# Patient Record
Sex: FEMALE | Race: WHITE | Employment: OTHER | URBAN - METROPOLITAN AREA
[De-identification: names, ages, dates, MRNs, and addresses within clinical notes are randomized per-mention and may not be internally consistent; named-entity substitution may affect disease eponyms.]

---

## 2017-04-03 ENCOUNTER — APPOINTMENT (OUTPATIENT)
Dept: GENERAL RADIOLOGY | Age: 82
End: 2017-04-03
Attending: EMERGENCY MEDICINE
Payer: MEDICARE

## 2017-04-03 ENCOUNTER — HOSPITAL ENCOUNTER (EMERGENCY)
Age: 82
Discharge: HOME | End: 2017-04-03
Attending: EMERGENCY MEDICINE
Payer: MEDICARE

## 2017-04-03 VITALS
BODY MASS INDEX: 23.41 KG/M2 | SYSTOLIC BLOOD PRESSURE: 126 MMHG | RESPIRATION RATE: 28 BRPM | OXYGEN SATURATION: 98 % | DIASTOLIC BLOOD PRESSURE: 48 MMHG | WEIGHT: 127.21 LBS | TEMPERATURE: 97.2 F | HEART RATE: 102 BPM | HEIGHT: 62 IN

## 2017-04-03 DIAGNOSIS — F17.200 SMOKER: ICD-10-CM

## 2017-04-03 DIAGNOSIS — J20.9 ACUTE BRONCHITIS, UNSPECIFIED ORGANISM: Primary | ICD-10-CM

## 2017-04-03 PROCEDURE — A9270 NON-COVERED ITEM OR SERVICE: HCPCS | Performed by: EMERGENCY MEDICINE

## 2017-04-03 PROCEDURE — 94640 AIRWAY INHALATION TREATMENT: CPT

## 2017-04-03 PROCEDURE — 74011000250 HC RX REV CODE- 250: Performed by: EMERGENCY MEDICINE

## 2017-04-03 PROCEDURE — 77030013140 HC MSK NEB VYRM -A

## 2017-04-03 PROCEDURE — 99284 EMERGENCY DEPT VISIT MOD MDM: CPT

## 2017-04-03 PROCEDURE — 74011636637 HC RX REV CODE- 636/637: Performed by: EMERGENCY MEDICINE

## 2017-04-03 PROCEDURE — 71020 XR CHEST PA LAT: CPT

## 2017-04-03 PROCEDURE — 93005 ELECTROCARDIOGRAM TRACING: CPT

## 2017-04-03 PROCEDURE — 74011250637 HC RX REV CODE- 250/637: Performed by: EMERGENCY MEDICINE

## 2017-04-03 RX ORDER — BENZONATATE 100 MG/1
100 CAPSULE ORAL
Status: COMPLETED | OUTPATIENT
Start: 2017-04-03 | End: 2017-04-03

## 2017-04-03 RX ORDER — ALBUTEROL SULFATE 1.25 MG/3ML
1.25 SOLUTION RESPIRATORY (INHALATION)
Qty: 25 EACH | Refills: 0 | Status: SHIPPED | OUTPATIENT
Start: 2017-04-03

## 2017-04-03 RX ORDER — PREDNISONE 20 MG/1
60 TABLET ORAL
Status: COMPLETED | OUTPATIENT
Start: 2017-04-03 | End: 2017-04-03

## 2017-04-03 RX ORDER — PREDNISONE 20 MG/1
40 TABLET ORAL DAILY
Qty: 10 TAB | Refills: 0 | Status: SHIPPED | OUTPATIENT
Start: 2017-04-03 | End: 2017-10-30

## 2017-04-03 RX ORDER — ALBUTEROL SULFATE 0.83 MG/ML
10 SOLUTION RESPIRATORY (INHALATION)
Status: COMPLETED | OUTPATIENT
Start: 2017-04-03 | End: 2017-04-03

## 2017-04-03 RX ORDER — BENZONATATE 100 MG/1
100 CAPSULE ORAL
Qty: 12 CAP | Refills: 0 | Status: SHIPPED | OUTPATIENT
Start: 2017-04-03 | End: 2017-10-30

## 2017-04-03 RX ORDER — NEBULIZER AND COMPRESSOR
1 EACH MISCELLANEOUS
Qty: 1 EACH | Refills: 0 | Status: SHIPPED | OUTPATIENT
Start: 2017-04-03

## 2017-04-03 RX ORDER — IPRATROPIUM BROMIDE AND ALBUTEROL SULFATE 2.5; .5 MG/3ML; MG/3ML
3 SOLUTION RESPIRATORY (INHALATION) ONCE
Status: COMPLETED | OUTPATIENT
Start: 2017-04-03 | End: 2017-04-03

## 2017-04-03 RX ADMIN — ALBUTEROL SULFATE 10 MG: 2.5 SOLUTION RESPIRATORY (INHALATION) at 18:21

## 2017-04-03 RX ADMIN — BENZONATATE 100 MG: 100 CAPSULE ORAL at 18:21

## 2017-04-03 RX ADMIN — IPRATROPIUM BROMIDE AND ALBUTEROL SULFATE 3 ML: .5; 3 SOLUTION RESPIRATORY (INHALATION) at 17:36

## 2017-04-03 RX ADMIN — PREDNISONE 60 MG: 20 TABLET ORAL at 17:35

## 2017-04-03 NOTE — ED NOTES
I have reviewed discharge instructions with the patient and caregiver. The patient and caregiver verbalized understanding. Pt taken to car via wheelchair.

## 2017-04-03 NOTE — DISCHARGE INSTRUCTIONS
Learning About Benefits From Quitting Smoking  How does quitting smoking make you healthier? If you're thinking about quitting smoking, you may have a few reasons to be smoke-free. Your health may be one of them. · When you quit smoking, you lower your risks for cancer, lung disease, heart attack, stroke, blood vessel disease, and blindness from macular degeneration. · When you're smoke-free, you get sick less often, and you heal faster. You are less likely to get colds, flu, bronchitis, and pneumonia. · As a nonsmoker, you may find that your mood is better and you are less stressed. When and how will you feel healthier? Quitting has real health benefits that start from day 1 of being smoke-free. And the longer you stay smoke-free, the healthier you get and the better you feel. The first hours  · After just 20 minutes, your blood pressure and heart rate go down. That means there's less stress on your heart and blood vessels. · Within 12 hours, the level of carbon monoxide in your blood drops back to normal. That makes room for more oxygen. With more oxygen in your body, you may notice that you have more energy than when you smoked. After 2 weeks  · Your lungs start to work better. · Your risk of heart attack starts to drop. After 1 month  · When your lungs are clear, you cough less and breathe deeper, so it's easier to be active. · Your sense of taste and smell return. That means you can enjoy food more than you have since you started smoking. Over the years  · After 1 year, your risk of heart disease is half what it would be if you kept smoking. · After 5 years, your risk of stroke starts to shrink. Within a few years after that, it's about the same as if you'd never smoked. · After 10 years, your risk of dying from lung cancer is cut by about half. And your risk for many other types of cancer is lower too. How would quitting help others in your life?   When you quit smoking, you improve the health of everyone who now breathes in your smoke. · Their heart, lung, and cancer risks drop, much like yours. · They are sick less. For babies and small children, living smoke-free means they're less likely to have ear infections, pneumonia, and bronchitis. · If you're a woman who is or will be pregnant someday, quitting smoking means a healthier . · Children who are close to you are less likely to become adult smokers. Where can you learn more? Go to http://vivian-millicent.info/. Enter [id number] [id number] [id number] in the search box to learn more about \"Learning About Benefits From Quitting Smoking. \"  Current as of: May 26, 2016  Content Version: 11.2  © 6082-5765 [employer]. Care instructions adapted under license by [employer] (which disclaims liability or warranty for this information). If you have questions about a medical condition or this instruction, always ask your healthcare professional. [place] [address] any warranty or liability for your use of this information. Bronchitis: Care Instructions  Your Care Instructions    Bronchitis is inflammation of the bronchial tubes, which carry air to the lungs. The tubes swell and produce mucus, or phlegm. The mucus and inflamed bronchial tubes make you cough. You may have trouble breathing. Most cases of bronchitis are caused by viruses like those that cause colds. Antibiotics usually do not help and they may be harmful. Bronchitis usually develops rapidly and lasts about 2 to 3 weeks in otherwise healthy people. Follow-up care is a key part of your treatment and safety. Be sure to make and go to all appointments, and call your doctor if you are having problems. It's also a good idea to know your test results and keep a list of the medicines you take. How can you care for yourself at home? · Take all medicines exactly as prescribed.  Call your doctor if you think you are having a problem with your medicine. · Get some extra rest.  · Take an over-the-counter pain medicine, such as acetaminophen (Tylenol), ibuprofen (Advil, Motrin), or naproxen (Aleve) to reduce fever and relieve body aches. Read and follow all instructions on the label. · Do not take two or more pain medicines at the same time unless the doctor told you to. Many pain medicines have acetaminophen, which is Tylenol. Too much acetaminophen (Tylenol) can be harmful. · Take an over-the-counter cough medicine that contains dextromethorphan to help quiet a dry, hacking cough so that you can sleep. Avoid cough medicines that have more than one active ingredient. Read and follow all instructions on the label. · Breathe moist air from a humidifier, hot shower, or sink filled with hot water. The heat and moisture will thin mucus so you can cough it out. · Do not smoke. Smoking can make bronchitis worse. If you need help quitting, talk to your doctor about stop-smoking programs and medicines. These can increase your chances of quitting for good. When should you call for help? Call 911 anytime you think you may need emergency care. For example, call if:  · You have severe trouble breathing. Call your doctor now or seek immediate medical care if:  · You have new or worse trouble breathing. · You cough up dark brown or bloody mucus (sputum). · You have a new or higher fever. · You have a new rash. Watch closely for changes in your health, and be sure to contact your doctor if:  · You cough more deeply or more often, especially if you notice more mucus or a change in the color of your mucus. · You are not getting better as expected. Where can you learn more? Go to http://vivian-millicent.info/. Enter H333 in the search box to learn more about \"Bronchitis: Care Instructions. \"  Current as of: May 23, 2016  Content Version: 11.2  © 0599-7867 [employer].  Care instructions adapted under license by [employer] (which disclaims liability or warranty for this information). If you have questions about a medical condition or this instruction, always ask your healthcare professional. [address] any warranty or liability for your use of this information. Learning About COPD and How to Prevent Lung Infections  How do lung infections affect COPD? Lung infections like pneumonia and acute bronchitis are common causes of COPD flare-ups. And people who have COPD are more likely to get these lung infections, especially if they smoke. When you have COPD, it is important to know the symptoms of pneumonia and acute bronchitis and call your doctor if you have them. Symptoms include:  · A cough that brings up more mucus than usual.  · Fever. · Shortness of breath. What can you do to prevent these infections? Stay healthy  · Get a flu shot every year. · Get a pneumococcal vaccine shot. If you have had one before, ask your doctor whether you need another dose. Two different types of pneumococcal vaccines are recommended for people ages 72 and older. · If you must be around people with colds or the flu, wash your hands often. · Do not smoke. This is the most important step you can take to prevent more damage to your lungs. If you need help quitting, talk to your doctor about stop-smoking programs and medicines. These can increase your chances of quitting for good. · Avoid secondhand smoke, air pollution, and high altitudes. Also avoid cold, dry air and hot, humid air. Stay at home with your windows closed when air pollution is bad. Exercise and eat well  · If your doctor recommends it, get more exercise. Walking is a good choice. Bit by bit, increase the amount you walk every day. Try for at least 30 minutes on most days of the week. · Eat regular, well-balanced meals. Eating right keeps your energy levels up and helps your body fight infection. · Get plenty of rest and sleep.   Follow-up care is a key part of your treatment and safety. Be sure to make and go to all appointments, and call your doctor if you are having problems. It's also a good idea to know your test results and keep a list of the medicines you take. Where can you learn more? Go to http://vivian-millicent.info/. Enter W222 in the search box to learn more about \"Learning About COPD and How to Prevent Lung Infections. \"  Current as of: May 23, 2016  Content Version: 11.2  © 5416-8262 [employer]. Care instructions adapted under license by [employer] (which disclaims liability or warranty for this information). If you have questions about a medical condition or this instruction, always ask your healthcare professional. [address] any warranty or liability for your use of this information.

## 2017-04-03 NOTE — ED PROVIDER NOTES
HPI Comments: Hillary Marcos is a 80 y.o. female with hx COPD, anemia, who presents ambulatory to the ED c/o cough, chills, congestion, rhinorrhea x 5-6 days. She sees Dr. Sandip Escalera for her hx of anemia, and was at his office today when a physician there recommended she come to the ED for concern of PNA. She reports a cough productive of clear sputum, and admits to SOB secondary to her cough. She uses a home inhaler. She specifically denies vomiting, diarrhea, fever. PCP: Glenn Saldaña,   Soc Hx: +tobacco, +EtOH    There are no other complaints, changes or physical findings at this time. The history is provided by the patient. Past Medical History:   Diagnosis Date    Breast cancer ([address] 4/4/2013    Carotid artery calcification     Constipation     COPD (chronic obstructive pulmonary disease) (HCC)     Dyslipidemia     Hypertension     Intraductal papilloma of breast 3/21/2013    Psoriasis     Thyroid adenoma     Thyroid disease        Past Surgical History:   Procedure Laterality Date    HX OOPHORECTOMY      RIGHT    HX THYROIDECTOMY      PARTIAL         Family History:   Problem Relation Age of Onset    Stroke Mother      CEREBRAL HEMMORRHAGE    Cancer Father      LUNG    Cancer Daughter      LUNG/SPINE       Social History     Social History    Marital status:      Spouse name: N/A    Number of children: N/A    Years of education: N/A     Occupational History    Not on file. Social History Main Topics    Smoking status: Current Every Day Smoker     Packs/day: 0.75     Years: 45.00    Smokeless tobacco: Never Used    Alcohol use 3.5 oz/week     7 Cans of beer per week    Drug use: Not on file    Sexual activity: Not on file     Other Topics Concern    Not on file     Social History Narrative         ALLERGIES: Review of patient's allergies indicates no known allergies. Review of Systems   Constitutional: Positive for chills.  Negative for activity change, appetite change, fever and unexpected weight change. HENT: Positive for congestion and rhinorrhea. Eyes: Negative for pain and visual disturbance. Respiratory: Positive for cough and shortness of breath. Cardiovascular: Negative for chest pain. Gastrointestinal: Negative for abdominal pain, diarrhea, nausea and vomiting. Genitourinary: Negative for dysuria. Musculoskeletal: Negative for back pain. Skin: Negative for rash. Neurological: Negative for headaches. All other systems reviewed and are negative. Vitals:    04/03/17 1531 04/03/17 1648 04/03/17 1738   BP: 146/75 133/69    Pulse: (!) 130 (!) 102    Resp: 28 28    Temp: 97.2 °F (36.2 °C)     SpO2: 96% 93% 100%   Weight: 57.7 kg (127 lb 3.3 oz)     Height: 5' 2\" (1.575 m)              Physical Exam   Constitutional: She is oriented to person, place, and time. She appears well-developed and well-nourished. She appears distressed. Elderly female in mild/moderate distress   HENT:   Head: Normocephalic and atraumatic. Mouth/Throat: Oropharynx is clear and moist.   Eyes: Conjunctivae and EOM are normal. Pupils are equal, round, and reactive to light. Right eye exhibits no discharge. Left eye exhibits no discharge. Neck: Normal range of motion. Neck supple. Cardiovascular: Normal rate and normal heart sounds. No murmur heard. HR - 96   Pulmonary/Chest: No respiratory distress. She has wheezes. She has no rales. Extensive wheezing and bronchospasms, prolonged end expirations. Abdominal: Soft. Bowel sounds are normal. She exhibits no distension. There is no tenderness. Musculoskeletal: Normal range of motion. She exhibits no edema. Neurological: She is alert and oriented to person, place, and time. No cranial nerve deficit. She exhibits normal muscle tone. Skin: Skin is warm and dry. No rash noted. She is not diaphoretic. Nursing note and vitals reviewed.        MDM  Number of Diagnoses or Management Options  Diagnosis management comments: DDx: CXR ordered from triage appears consistent with COPD exacerbation. Given lack of fever and clear sputum, will hold antibiotics and start steroids and B2 agonists. Amount and/or Complexity of Data Reviewed  Tests in the radiology section of CPT®: ordered and reviewed  Tests in the medicine section of CPT®: ordered and reviewed  Review and summarize past medical records: yes  Independent visualization of images, tracings, or specimens: yes      ED Course       Procedures    PROGRESS NOTE:  7:35 PM  Pt reevaluated. Pt feeling better, saturating at 99%, able to take deep breaths without bronchospasms. Counseled pt on smoking cessation. Written by Marcos Hair ED Scribe, as dictated by Jose De Jesus Cagle MD    LABORATORY TESTS:  Recent Results (from the past 12 hour(s))   EKG, 12 LEAD, INITIAL    Collection Time: 04/03/17  3:36 PM   Result Value Ref Range    Ventricular Rate 83 BPM    Atrial Rate 83 BPM    P-R Interval 134 ms    QRS Duration 80 ms    Q-T Interval 380 ms    QTC Calculation (Bezet) 446 ms    Calculated P Axis 82 degrees    Calculated R Axis 38 degrees    Calculated T Axis 58 degrees    Diagnosis       Sinus rhythm with premature supraventricular complexes       IMAGING RESULTS:  XR CHEST PA LAT   Final Result   CXR Results  (Last 48 hours)               04/03/17 1724  XR CHEST PA LAT Final result    Impression:  IMPRESSION: Emphysema. No acute findings. Narrative:  EXAM:  XR CHEST PA LAT       INDICATION:   cough       COMPARISON: March 22, 2013. FINDINGS: PA and lateral radiographs of the chest demonstrate moderately   hyperexpanded lungs consistent with emphysema. There is no consolidation or   pulmonary edema. No pneumothorax or pleural effusion is shown. Cardiac,   mediastinal and hilar contours appear within normal limits with mild   atherosclerotic calcification of the aortic arch again noted. The bones are   severely osteopenic.  No vertebral compression fracture is shown. MEDICATIONS GIVEN:  Medications   albuterol-ipratropium (DUO-NEB) 2.5 MG-0.5 MG/3 ML (3 mL Nebulization Given 4/3/17 1736)   predniSONE (DELTASONE) tablet 60 mg (60 mg Oral Given 4/3/17 1735)   albuterol (PROVENTIL VENTOLIN) nebulizer solution 10 mg (10 mg Nebulization Given 4/3/17 1821)   benzonatate (TESSALON) capsule 100 mg (100 mg Oral Given 4/3/17 1821)       IMPRESSION:  1. Acute bronchitis, unspecified organism    2. Smoker        PLAN:  1. Discharge Medication List as of 4/3/2017  7:38 PM      START taking these medications    Details   predniSONE (DELTASONE) 20 mg tablet Take 2 Tabs by mouth daily. , Print, Disp-10 Tab, R-0      benzonatate (TESSALON) 100 mg capsule Take 1 Cap by mouth three (3) times daily as needed for Cough. , Print, Disp-12 Cap, R-0      Nebulizer & Compressor machine 1 Each by Does Not Apply route every four (4) hours as needed. As directed, Print, Disp-1 Each, R-0      albuterol (ACCUNEB) 1.25 mg/3 mL nebu Take 3 mL by inhalation every four (4) hours as needed (wheezing). , Print, Disp-25 Each, R-0         CONTINUE these medications which have NOT CHANGED    Details   HYDROcodone-acetaminophen (VICODIN) 5-500 mg per tablet Take 1 Tab by mouth every four (4) hours as needed for Pain. Print, 1 Tab, Disp-10 Tab, R-0      PSYLLIUM SEED, WITH SUGAR, (METAMUCIL PO) Take  by mouth as needed. Historical Med      atorvastatin (LIPITOR) 20 mg tablet Take  by mouth daily. Historical Med      levothyroxine (SYNTHROID) 100 mcg tablet Take  by mouth Daily (before breakfast). Historical Med      lisinopril (PRINIVIL, ZESTRIL) 40 mg tablet Take 40 mg by mouth daily. Historical Med, 40 mg      albuterol (PROVENTIL HFA, VENTOLIN HFA) 90 mcg/actuation inhaler Take 1 Puff by inhalation. Historical Med, 1 Puff      hydrochlorothiazide (HYDRODIURIL) 25 mg tablet Take 25 mg by mouth daily.   Historical Med, 25 mg      calcium-cholecalciferol, d3, (CALCIUM 600 + D) 600-125 mg-unit Tab Take  by mouth. Historical Med      omega-3 fatty acids-vitamin e (FISH OIL) 1,000 mg Cap Take 1 Cap by mouth. Historical Med, 1 Cap           2. Follow-up Information     Follow up With Details Comments Enrique 68, DO Schedule an appointment as soon as possible for a visit in 2 days  [address] [place] [phone]      [facility] EMERGENCY DEPT  If symptoms worsen with fever, colored sputum or difficulty breathing. [address] [place] [address]  [phone]        Return to ED if worse     DISCHARGE NOTE  7:44 PM  The patient has been re-evaluated and is ready for discharge. Reviewed available results, diagnosis, and discharge instructions with patient. Pt has conveyed understanding and agreement with the diagnosis and plan. Pt agrees to F/U as recommended, or return to the ED if their sxs worsen. Written by Jer Alva, ED Scribe, as dictated by Pee Linn MD.    This note is prepared by Jer Alva acting as Scribe for Pee Linn MD.    Pee Linn MD: The scribe's documentation has been prepared under my direction and personally reviewed by me in its entirety. I confirm that the note above accurately reflects all work, treatment, procedures, and medical decision making performed by me.

## 2017-04-04 LAB
ATRIAL RATE: 83 BPM
CALCULATED P AXIS, ECG09: 82 DEGREES
CALCULATED R AXIS, ECG10: 38 DEGREES
CALCULATED T AXIS, ECG11: 58 DEGREES
DIAGNOSIS, 93000: NORMAL
P-R INTERVAL, ECG05: 134 MS
Q-T INTERVAL, ECG07: 380 MS
QRS DURATION, ECG06: 80 MS
QTC CALCULATION (BEZET), ECG08: 446 MS
VENTRICULAR RATE, ECG03: 83 BPM

## 2017-10-30 ENCOUNTER — HOSPITAL ENCOUNTER (EMERGENCY)
Age: 82
Discharge: HOME | End: 2017-10-30
Attending: FAMILY MEDICINE

## 2017-10-30 VITALS
HEIGHT: 63 IN | SYSTOLIC BLOOD PRESSURE: 193 MMHG | BODY MASS INDEX: 23.04 KG/M2 | WEIGHT: 130 LBS | RESPIRATION RATE: 18 BRPM | HEART RATE: 99 BPM | TEMPERATURE: 97.5 F | OXYGEN SATURATION: 94 % | DIASTOLIC BLOOD PRESSURE: 81 MMHG

## 2017-10-30 DIAGNOSIS — L03.116 CELLULITIS OF LEG, LEFT: Primary | ICD-10-CM

## 2017-10-30 RX ORDER — CEPHALEXIN 500 MG/1
500 CAPSULE ORAL EVERY 12 HOURS
Qty: 20 CAP | Refills: 0 | Status: SHIPPED | OUTPATIENT
Start: 2017-10-30 | End: 2017-10-30

## 2017-10-30 RX ORDER — CEPHALEXIN 500 MG/1
500 CAPSULE ORAL EVERY 12 HOURS
Qty: 20 CAP | Refills: 0 | Status: SHIPPED | OUTPATIENT
Start: 2017-10-30 | End: 2017-11-09

## 2017-10-30 NOTE — UC PROVIDER NOTE
HPI Comments: Latest Cr 0.88 two weeks ago    Patient is a 80 y.o. female presenting with leg problem. The history is provided by the patient. Leg Problem    This is a new problem. Episode onset: was cut on left lower leg by a stick 1 week ago, now reports worsening redness, swelling, pain. The problem occurs constantly. The problem has been gradually worsening. The quality of the pain is described as aching. The pain is moderate. Associated symptoms comments: Denies fever. The symptoms are aggravated by contact. She has tried nothing for the symptoms. 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 Social History Main Topics    Smoking status: Current Every Day Smoker     Packs/day: 0.75     Years: 45.00    Smokeless tobacco: Never Used    Alcohol use 3.5 oz/week     7 Cans of beer per week    Drug use: Not on file    Sexual activity: Not on file     Other Topics Concern    Not on file     Social History Narrative                ALLERGIES: Review of patient's allergies indicates no known allergies. Review of Systems   Constitutional: Negative for chills and fever. Respiratory: Negative for shortness of breath and wheezing.     Cardiovascular: Negative for chest pain and palpitations. Gastrointestinal: Negative for nausea and vomiting. Skin: Positive for color change and wound. Neurological: Negative for dizziness and headaches. Vitals:    10/30/17 1600   BP: 193/81   Pulse: 99   Resp: 18   Temp: 97.5 °F (36.4 °C)   SpO2: 94%   Weight: 59 kg (130 lb)   Height: 5' 3\" (1.6 m)       Physical Exam   Constitutional: She appears well-developed and well-nourished. No distress. Neurological: She is alert. Skin: She is not diaphoretic. Left lower leg, anterior: 1cm puncture wound with surrounding erythema, swelling, TTP extending 2 cm   Psychiatric: She has a normal mood and affect. Her behavior is normal. Judgment and thought content normal.   Nursing note and vitals reviewed. MDM     Differential Diagnosis; Clinical Impression; Plan:     CLINICAL IMPRESSION:  Cellulitis of leg, left  (primary encounter diagnosis)    Plan:  1. keflex  2. Elevate at rest  3. F/u with pcp  Risk of Significant Complications, Morbidity, and/or Mortality:   Presenting problems: Moderate  Management options:   Moderate  Progress:   Patient progress:  Stable      Procedures

## 2017-10-30 NOTE — DISCHARGE INSTRUCTIONS
Cellulitis: Care Instructions  Your Care Instructions    Cellulitis is a skin infection. It often occurs after a break in the skin from a scrape, cut, bite, or puncture, or after a rash. The doctor has checked you carefully, but problems can develop later. If you notice any problems or new symptoms, get medical treatment right away. Follow-up care is a key part of your treatment and safety. Be sure to make and go to all appointments, and call your doctor if you are having problems. It's also a good idea to know your test results and keep a list of the medicines you take. How can you care for yourself at home? · Take your antibiotics as directed. Do not stop taking them just because you feel better. You need to take the full course of antibiotics. · Prop up the infected area on pillows to reduce pain and swelling. Try to keep the area above the level of your heart as often as you can. · If your doctor told you how to care for your wound, follow your doctor's instructions. If you did not get instructions, follow this general advice:  ¨ Wash the wound with clean water 2 times a day. Don't use hydrogen peroxide or alcohol, which can slow healing. ¨ You may cover the wound with a thin layer of petroleum jelly, such as Vaseline, and a nonstick bandage. ¨ Apply more petroleum jelly and replace the bandage as needed. · Be safe with medicines. Take pain medicines exactly as directed. ¨ If the doctor gave you a prescription medicine for pain, take it as prescribed. ¨ If you are not taking a prescription pain medicine, ask your doctor if you can take an over-the-counter medicine. To prevent cellulitis in the future  · Try to prevent cuts, scrapes, or other injuries to your skin. Cellulitis most often occurs where there is a break in the skin. · If you get a scrape, cut, mild burn, or bite, wash the wound with clean water as soon as you can to help avoid infection.  Don't use hydrogen peroxide or alcohol, which can slow healing. · If you have swelling in your legs (edema), support stockings and good skin care may help prevent leg sores and cellulitis. · Take care of your feet, especially if you have diabetes or other conditions that increase the risk of infection. Wear shoes and socks. Do not go barefoot. If you have athlete's foot or other skin problems on your feet, talk to your doctor about how to treat them. When should you call for help? Call your doctor now or seek immediate medical care if:  ? · You have signs that your infection is getting worse, such as:  ¨ Increased pain, swelling, warmth, or redness. ¨ Red streaks leading from the area. ¨ Pus draining from the area. ¨ A fever. ? · You get a rash. ? Watch closely for changes in your health, and be sure to contact your doctor if:  ? · You are not getting better after 1 day (24 hours). ? · You do not get better as expected. Where can you learn more? Go to http://vivian-millicent.info/. Greg Selby in the search box to learn more about \"Cellulitis: Care Instructions. \"  Current as of: October 13, 2016  Content Version: 11.4  © 4893-7095 [employer]. Care instructions adapted under license by [employer] (which disclaims liability or warranty for this information). If you have questions about a medical condition or this instruction, always ask your healthcare professional. [place] [address] any warranty or liability for your use of this information.

## 2018-10-01 ENCOUNTER — OFFICE VISIT (OUTPATIENT)
Dept: URGENT CARE | Age: 83
End: 2018-10-01

## 2018-10-01 VITALS
TEMPERATURE: 98.1 F | HEIGHT: 63 IN | SYSTOLIC BLOOD PRESSURE: 123 MMHG | DIASTOLIC BLOOD PRESSURE: 71 MMHG | OXYGEN SATURATION: 98 % | WEIGHT: 127 LBS | BODY MASS INDEX: 22.5 KG/M2 | RESPIRATION RATE: 16 BRPM | HEART RATE: 89 BPM

## 2018-10-01 DIAGNOSIS — S51.811A SKIN TEAR OF RIGHT FOREARM WITHOUT COMPLICATION, INITIAL ENCOUNTER: Primary | ICD-10-CM

## 2018-10-01 RX ORDER — AMOXICILLIN AND CLAVULANATE POTASSIUM 500; 125 MG/1; MG/1
1 TABLET, FILM COATED ORAL 2 TIMES DAILY
Qty: 20 TAB | Refills: 0 | Status: SHIPPED | OUTPATIENT
Start: 2018-10-01 | End: 2018-10-11

## 2018-10-01 NOTE — PROGRESS NOTES
HPI Comments: Haroldo Mayfield presents with skin tear of right forearm due to daughter's dog scrapping skin off with toenail. Reports mild pain, no drainage. Has applied bandage. Has not taken anything for pain. Last tetanus unknown. The history is provided by the patient. Past Medical History:   Diagnosis Date    Breast cancer ([facility] [address] 4/4/2013    Carotid artery calcification     Constipation     COPD (chronic obstructive pulmonary disease) (HCC)     Dyslipidemia     Hypertension     Intraductal papilloma of breast 3/21/2013    Psoriasis     Thyroid adenoma     Thyroid disease         Past Surgical History:   Procedure Laterality Date    HX OOPHORECTOMY      RIGHT    HX THYROIDECTOMY      PARTIAL         Family History   Problem Relation Age of Onset    Stroke Mother      CEREBRAL HEMMORRHAGE    Cancer Father      LUNG    Cancer Daughter      LUNG/SPINE        Social History     Social History    Marital status:      Spouse name: N/A    Number of children: N/A    Years of education: N/A     Occupational History    Not on file. Social History Main Topics    Smoking status: Current Every Day Smoker     Packs/day: 0.75     Years: 45.00    Smokeless tobacco: Never Used    Alcohol use 3.5 oz/week     7 Cans of beer per week    Drug use: Not on file    Sexual activity: Not on file     Other Topics Concern    Not on file     Social History Narrative                ALLERGIES: Review of patient's allergies indicates no known allergies. Review of Systems   Constitutional: Negative for chills and fever. Respiratory: Negative for shortness of breath and wheezing. Cardiovascular: Negative for chest pain and palpitations. Musculoskeletal: Negative for myalgias. Skin: Positive for wound. Hematological: Negative for adenopathy.        Vitals:    10/01/18 1457 10/01/18 1458 10/01/18 1512   BP:  123/71    Pulse:  89    Resp:  16    Temp:  98.1 °F (36.7 °C)    SpO2:   98%   Weight:  127 lb (57.6 kg)    Height: 5' 3\" (1.6 m) 5' 3\" (1.6 m)        Physical Exam   Constitutional: She appears well-developed and well-nourished. No distress. Neurological: She is alert. Skin: She is not diaphoretic. Right forearm: 1x1cm skin tear - debrided, dressing applied   Psychiatric: She has a normal mood and affect. Her behavior is normal. Judgment and thought content normal.   Nursing note and vitals reviewed. [facility]    ICD-10-CM ICD-9-CM    1. Skin tear of right forearm without complication, initial encounter S51.811A 881.00      Medications Ordered Today   Medications    amoxicillin-clavulanate (AUGMENTIN) 500-125 mg per tablet     Sig: Take 1 Tab by mouth two (2) times a day for 10 days. Dispense:  20 Tab     Refill:  0     The patients condition was discussed with the patient and they understand. The patient is to follow up with PCP for tetanus shot, we only have Tdap available - pt declined. If signs and symptoms become worse the pt is to go to the ER. The patient is to take medications as prescribed.              Procedures

## 2018-10-01 NOTE — PATIENT INSTRUCTIONS
Skin Tears: Care Instructions  Your Care Instructions  As we get older, our skin gets drier and more fragile. Sometimes this can cause the outer layers of skin to split and tear open. Skin tears are treated in different ways. In some cases, doctors use pieces of tape called Steri-Strips to pull the skin together and help it heal. Other times, it's best to leave the tear open and cover it with a special wound-care bandage. Skin tears are usually not serious. They usually heal in a few weeks. But how long you take to heal depends on your body and the type of tear you have. Sometimes the torn piece of skin is used to protect the wound while it heals. But that piece of skin does not heal. It may fall off on its own. Or the doctor may remove it. As your tear heals, it's important to keep it clean to help prevent infection. The doctor has checked you carefully, but problems can develop later. If you notice any problems or new symptoms, get medical treatment right away. Follow-up care is a key part of your treatment and safety. Be sure to make and go to all appointments, and call your doctor if you are having problems. It's also a good idea to know your test results and keep a list of the medicines you take. How can you care for yourself at home? · If you have pain, ask your doctor if you can take an over-the-counter pain medicine, such as acetaminophen (Tylenol), ibuprofen (Advil, Motrin), or naproxen (Aleve). Be safe with medicines. Read and follow all instructions on the label. · If you have a bandage, follow your doctor's instructions for changing it. · If you have Steri-Strips, leave them on until they fall off. · Follow your doctor's instructions about bathing. · Gently wash the skin tear with plain water 2 times a day. Do not rub the area. · Let the area air dry. Or you can pat it carefully with a soft towel. When should you call for help?   Call your doctor now or seek immediate medical care if:    · You have signs of infection, such as:  ¨ Increased pain, swelling, warmth, or redness around the tear. ¨ Red streaks leading from the tear. ¨ Pus draining from the tear. ¨ A fever.     · The tear starts to bleed a lot. Small amounts of blood are normal.    Watch closely for changes in your health, and be sure to contact your doctor if:    · You do not get better as expected. Where can you learn more? Go to http://vivian-millicent.info/. Enter J603 in the search box to learn more about \"Skin Tears: Care Instructions. \"  Current as of: November 20, 2017  Content Version: 11.7  © 5673-9168 [employer]. Care instructions adapted under license by [employer] (which disclaims liability or warranty for this information). If you have questions about a medical condition or this instruction, always ask your healthcare professional. [place] [address] any warranty or liability for your use of this information.

## 2018-10-01 NOTE — MR AVS SNAPSHOT
[address] [facility] [address] 
[phone] Patient: Isabella Shahid MRN: [id number] MT Visit Information Date & Time Provider Department Dept. Phone Encounter #  
 10/1/2018  3:15 PM [address] [facility] [phone] [id number] Upcoming Health Maintenance Date Due DTaP/Tdap/Td series (1 - Tdap) 1948 Shingrix Vaccine Age 50> (1 of 2) 1977 GLAUCOMA SCREENING Q2Y 1992 Pneumococcal 65+ High/Highest Risk (1 of 2 - PCV13) 1992 MEDICARE YEARLY EXAM 3/14/2018 Influenza Age 5 to Adult 2018 Allergies as of 10/1/2018  Review Complete On: 10/1/2018 By: Kenisha Orr MD  
 No Known Allergies Current Immunizations  Never Reviewed No immunizations on file. Not reviewed this visit You Were Diagnosed With   
  
 Codes Comments Skin tear of right forearm without complication, initial encounter    -  Primary ICD-10-CM: V43.958P ICD-9-CM: 881.00 Vitals BP Pulse Temp Resp Height(growth percentile) Weight(growth percentile) 123/71 89 98.1 °F (36.7 °C) 16 5' 3\" (1.6 m) 127 lb (57.6 kg) SpO2 BMI OB Status Smoking Status 98% 22.5 kg/m2 Postmenopausal Current Every Day Smoker Vitals History BMI and BSA Data Body Mass Index Body Surface Area  
 22.5 kg/m 2 1.6 m 2 Preferred Pharmacy Pharmacy Name Phone [id number] [address] [place] [phone] Your Updated Medication List  
  
   
This list is accurate as of 10/1/18  3:26 PM.  Always use your most recent med list.  
  
  
  
  
 * albuterol 1.25 mg/3 mL Nebu Commonly known as:  Noralee Cidra Take 3 mL by inhalation every four (4) hours as needed (wheezing). * albuterol 90 mcg/actuation inhaler Commonly known as:  PROVENTIL HFA, VENTOLIN HFA, PROAIR HFA Take 1 Puff by inhalation. amoxicillin-clavulanate 500-125 mg per tablet Commonly known as:  AUGMENTIN Take 1 Tab by mouth two (2) times a day for 10 days. CALCIUM 600 + D 600-125 mg-unit Tab Generic drug:  calcium-cholecalciferol (d3) Take  by mouth. FISH OIL 1,000 mg Cap Generic drug:  omega-3 fatty acids-vitamin e Take 1 Cap by mouth. hydroCHLOROthiazide 25 mg tablet Commonly known as:  HYDRODIURIL Take 25 mg by mouth daily. LIPITOR 20 mg tablet Generic drug:  atorvastatin Take  by mouth daily. lisinopril 40 mg tablet Commonly known as:  Nancylee Foyer Take 40 mg by mouth daily. METAMUCIL PO Take  by mouth as needed. Nebulizer & Compressor machine 1 Each by Does Not Apply route every four (4) hours as needed. As directed SYNTHROID 100 mcg tablet Generic drug:  levothyroxine Take  by mouth Daily (before breakfast). * Notice: This list has 2 medication(s) that are the same as other medications prescribed for you. Read the directions carefully, and ask your doctor or other care provider to review them with you. Prescriptions Sent to Pharmacy Refills  
 amoxicillin-clavulanate (AUGMENTIN) 500-125 mg per tablet 0 Sig: Take 1 Tab by mouth two (2) times a day for 10 days. Class: Normal  
 Pharmacy: [id number] [address] #: [phone] Route: Oral  
  
Patient Instructions Skin Tears: Care Instructions Your Care Instructions As we get older, our skin gets drier and more fragile. Sometimes this can cause the outer layers of skin to split and tear open. Skin tears are treated in different ways. In some cases, doctors use pieces of tape called Steri-Strips to pull the skin together and help it heal. Other times, it's best to leave the tear open and cover it with a special wound-care bandage. Skin tears are usually not serious. They usually heal in a few weeks.  But how long you take to heal depends on your body and the type of tear you have. Sometimes the torn piece of skin is used to protect the wound while it heals. But that piece of skin does not heal. It may fall off on its own. Or the doctor may remove it. As your tear heals, it's important to keep it clean to help prevent infection. The doctor has checked you carefully, but problems can develop later. If you notice any problems or new symptoms, get medical treatment right away. Follow-up care is a key part of your treatment and safety. Be sure to make and go to all appointments, and call your doctor if you are having problems. It's also a good idea to know your test results and keep a list of the medicines you take. How can you care for yourself at home? · If you have pain, ask your doctor if you can take an over-the-counter pain medicine, such as acetaminophen (Tylenol), ibuprofen (Advil, Motrin), or naproxen (Aleve). Be safe with medicines. Read and follow all instructions on the label. · If you have a bandage, follow your doctor's instructions for changing it. · If you have Steri-Strips, leave them on until they fall off. · Follow your doctor's instructions about bathing. · Gently wash the skin tear with plain water 2 times a day. Do not rub the area. · Let the area air dry. Or you can pat it carefully with a soft towel. When should you call for help? Call your doctor now or seek immediate medical care if: 
  · You have signs of infection, such as: 
¨ Increased pain, swelling, warmth, or redness around the tear. ¨ Red streaks leading from the tear. ¨ Pus draining from the tear. ¨ A fever.  
  · The tear starts to bleed a lot. Small amounts of blood are normal.  
 Watch closely for changes in your health, and be sure to contact your doctor if: 
  · You do not get better as expected. Where can you learn more? Go to http://vivian-millicent.info/. Enter E642 in the search box to learn more about \"Skin Tears: Care Instructions. \" Current as of: November 20, 2017 Content Version: 11.7 © 7669-1185 Indicative Software, F[employer]. Care instructions adapted under license by [employer] (which disclaims liability or warranty for this information). If you have questions about a medical condition or this instruction, always ask your healthcare professional. [address] any warranty or liability for your use of this information. Introducing [facility] & HEALTH SERVICES! [facility] introduces Meridian patient portal. Now you can access parts of your medical record, email your doctor's office, and request medication refills online. 1. In your internet browser, go to [email or web address] 2. Click on the First Time User? Click Here link in the Sign In box. You will see the New Member Sign Up page. 3. Enter your Meridian Access Code exactly as it appears below. You will not need to use this code after youve completed the sign-up process. If you do not sign up before the expiration date, you must request a new code. · Meridian Access Code: [email or web address] Expires: 12/30/2018  3:26 PM 
 
4. Enter the last four digits of your Social Security Number (xxxx) and Date of Birth (mm/dd/yyyy) as indicated and click Submit. You will be taken to the next sign-up page. 5. Create a Meridian ID. This will be your Meridian login ID and cannot be changed, so think of one that is secure and easy to remember. 6. Create a Meridian password. You can change your password at any time. 7. Enter your Password Reset Question and Answer. This can be used at a later time if you forget your password. 8. Enter your e-mail address. You will receive e-mail notification when new information is available in [address] 9. Click Sign Up. You can now view and download portions of your medical record. 10. Click the Download Summary menu link to download a portable copy of your medical information. If you have questions, please visit the Frequently Asked Questions section of the [facility] website. Remember, [facility] is NOT to be used for urgent needs. For medical emergencies, dial 911. Now available from your iPhone and Android! Please provide this summary of care documentation to your next provider. Your primary care clinician is listed as [address] [place] [address]. If you have any questions after today's visit, please call [phone].

## 2019-04-11 ENCOUNTER — APPOINTMENT (OUTPATIENT)
Dept: GENERAL RADIOLOGY | Age: 84
End: 2019-04-11
Attending: EMERGENCY MEDICINE
Payer: MEDICARE

## 2019-04-11 PROCEDURE — 77030029684 HC NEB SM VOL KT MONA -A

## 2019-04-11 PROCEDURE — 99283 EMERGENCY DEPT VISIT LOW MDM: CPT

## 2019-04-12 ENCOUNTER — HOSPITAL ENCOUNTER (EMERGENCY)
Age: 84
Discharge: HOME | End: 2019-04-12
Attending: EMERGENCY MEDICINE
Payer: MEDICARE

## 2019-04-12 ENCOUNTER — APPOINTMENT (OUTPATIENT)
Dept: GENERAL RADIOLOGY | Age: 84
End: 2019-04-12
Attending: EMERGENCY MEDICINE
Payer: MEDICARE

## 2019-04-12 VITALS
HEIGHT: 60 IN | HEART RATE: 85 BPM | OXYGEN SATURATION: 95 % | TEMPERATURE: 98 F | DIASTOLIC BLOOD PRESSURE: 58 MMHG | SYSTOLIC BLOOD PRESSURE: 150 MMHG | WEIGHT: 132 LBS | BODY MASS INDEX: 25.91 KG/M2 | RESPIRATION RATE: 16 BRPM

## 2019-04-12 DIAGNOSIS — E86.0 DEHYDRATION: ICD-10-CM

## 2019-04-12 DIAGNOSIS — K59.00 CONSTIPATION, UNSPECIFIED CONSTIPATION TYPE: Primary | ICD-10-CM

## 2019-04-12 DIAGNOSIS — E87.1 HYPONATREMIA: ICD-10-CM

## 2019-04-12 LAB
ALBUMIN SERPL-MCNC: 4 G/DL (ref 3.5–5)
ALBUMIN/GLOB SERPL: 1.3 {RATIO} (ref 1.1–2.2)
ALP SERPL-CCNC: 71 U/L (ref 45–117)
ALT SERPL-CCNC: 31 U/L (ref 12–78)
ANION GAP SERPL CALC-SCNC: 8 MMOL/L (ref 5–15)
AST SERPL-CCNC: 29 U/L (ref 15–37)
BASOPHILS # BLD: 0 K/UL (ref 0–0.1)
BASOPHILS NFR BLD: 1 % (ref 0–1)
BILIRUB SERPL-MCNC: 0.7 MG/DL (ref 0.2–1)
BUN SERPL-MCNC: 18 MG/DL (ref 6–20)
BUN/CREAT SERPL: 25 (ref 12–20)
CALCIUM SERPL-MCNC: 8.7 MG/DL (ref 8.5–10.1)
CHLORIDE SERPL-SCNC: 94 MMOL/L (ref 97–108)
CO2 SERPL-SCNC: 26 MMOL/L (ref 21–32)
CREAT SERPL-MCNC: 0.72 MG/DL (ref 0.55–1.02)
DIFFERENTIAL METHOD BLD: ABNORMAL
EOSINOPHIL # BLD: 0 K/UL (ref 0–0.4)
EOSINOPHIL NFR BLD: 0 % (ref 0–7)
ERYTHROCYTE [DISTWIDTH] IN BLOOD BY AUTOMATED COUNT: 18.6 % (ref 11.5–14.5)
GLOBULIN SER CALC-MCNC: 3.2 G/DL (ref 2–4)
GLUCOSE SERPL-MCNC: 109 MG/DL (ref 65–100)
HCT VFR BLD AUTO: 36.1 % (ref 35–47)
HGB BLD-MCNC: 12.2 G/DL (ref 11.5–16)
IMM GRANULOCYTES # BLD AUTO: 0.1 K/UL (ref 0–0.04)
IMM GRANULOCYTES NFR BLD AUTO: 1 % (ref 0–0.5)
LYMPHOCYTES # BLD: 1.9 K/UL (ref 0.8–3.5)
LYMPHOCYTES NFR BLD: 28 % (ref 12–49)
MCH RBC QN AUTO: 32 PG (ref 26–34)
MCHC RBC AUTO-ENTMCNC: 33.8 G/DL (ref 30–36.5)
MCV RBC AUTO: 94.8 FL (ref 80–99)
MONOCYTES # BLD: 1 K/UL (ref 0–1)
MONOCYTES NFR BLD: 14 % (ref 5–13)
NEUTS SEG # BLD: 3.8 K/UL (ref 1.8–8)
NEUTS SEG NFR BLD: 56 % (ref 32–75)
NRBC # BLD: 0.05 K/UL (ref 0–0.01)
NRBC BLD-RTO: 0.7 PER 100 WBC
PLATELET # BLD AUTO: 268 K/UL (ref 150–400)
PMV BLD AUTO: 10.6 FL (ref 8.9–12.9)
POTASSIUM SERPL-SCNC: 4.1 MMOL/L (ref 3.5–5.1)
PROT SERPL-MCNC: 7.2 G/DL (ref 6.4–8.2)
RBC # BLD AUTO: 3.81 M/UL (ref 3.8–5.2)
SODIUM SERPL-SCNC: 128 MMOL/L (ref 136–145)
WBC # BLD AUTO: 6.8 K/UL (ref 3.6–11)

## 2019-04-12 PROCEDURE — 74011250637 HC RX REV CODE- 250/637: Performed by: EMERGENCY MEDICINE

## 2019-04-12 PROCEDURE — 36415 COLL VENOUS BLD VENIPUNCTURE: CPT

## 2019-04-12 PROCEDURE — 74011000250 HC RX REV CODE- 250: Performed by: EMERGENCY MEDICINE

## 2019-04-12 PROCEDURE — 74018 RADEX ABDOMEN 1 VIEW: CPT

## 2019-04-12 PROCEDURE — 85025 COMPLETE CBC W/AUTO DIFF WBC: CPT

## 2019-04-12 PROCEDURE — 96374 THER/PROPH/DIAG INJ IV PUSH: CPT

## 2019-04-12 PROCEDURE — 96375 TX/PRO/DX INJ NEW DRUG ADDON: CPT

## 2019-04-12 PROCEDURE — 96361 HYDRATE IV INFUSION ADD-ON: CPT

## 2019-04-12 PROCEDURE — 94640 AIRWAY INHALATION TREATMENT: CPT

## 2019-04-12 PROCEDURE — 74011250636 HC RX REV CODE- 250/636: Performed by: EMERGENCY MEDICINE

## 2019-04-12 PROCEDURE — 80053 COMPREHEN METABOLIC PANEL: CPT

## 2019-04-12 RX ORDER — TRAMADOL HYDROCHLORIDE 50 MG/1
100 TABLET ORAL
COMMUNITY
End: 2019-04-20

## 2019-04-12 RX ORDER — MORPHINE SULFATE 4 MG/ML
4 INJECTION INTRAVENOUS ONCE
Status: COMPLETED | OUTPATIENT
Start: 2019-04-12 | End: 2019-04-12

## 2019-04-12 RX ORDER — MAGNESIUM CITRATE
296 SOLUTION, ORAL ORAL
Status: COMPLETED | OUTPATIENT
Start: 2019-04-12 | End: 2019-04-12

## 2019-04-12 RX ORDER — ONDANSETRON 2 MG/ML
8 INJECTION INTRAMUSCULAR; INTRAVENOUS
Status: COMPLETED | OUTPATIENT
Start: 2019-04-12 | End: 2019-04-12

## 2019-04-12 RX ORDER — MAGNESIUM CITRATE
SOLUTION, ORAL ORAL
Qty: 2 BOTTLE | Refills: 0 | Status: SHIPPED | OUTPATIENT
Start: 2019-04-12

## 2019-04-12 RX ORDER — IPRATROPIUM BROMIDE AND ALBUTEROL SULFATE 2.5; .5 MG/3ML; MG/3ML
3 SOLUTION RESPIRATORY (INHALATION)
Status: COMPLETED | OUTPATIENT
Start: 2019-04-12 | End: 2019-04-12

## 2019-04-12 RX ADMIN — SODIUM CHLORIDE 1000 ML: 900 INJECTION, SOLUTION INTRAVENOUS at 03:59

## 2019-04-12 RX ADMIN — ONDANSETRON 8 MG: 2 INJECTION INTRAMUSCULAR; INTRAVENOUS at 04:06

## 2019-04-12 RX ADMIN — MAGNESIUM CITRATE 296 ML: 1.75 LIQUID ORAL at 05:06

## 2019-04-12 RX ADMIN — IPRATROPIUM BROMIDE AND ALBUTEROL SULFATE 3 ML: .5; 3 SOLUTION RESPIRATORY (INHALATION) at 03:43

## 2019-04-12 RX ADMIN — MORPHINE SULFATE 4 MG: 4 INJECTION INTRAVENOUS at 04:07

## 2019-04-12 NOTE — DISCHARGE INSTRUCTIONS
Patient Education        Constipation: Care Instructions  Your Care Instructions    Constipation means that you have a hard time passing stools (bowel movements). People pass stools from 3 times a day to once every 3 days. What is normal for you may be different. Constipation may occur with pain in the rectum and cramping. The pain may get worse when you try to pass stools. Sometimes there are small amounts of bright red blood on toilet paper or the surface of stools. This is because of enlarged veins near the rectum (hemorrhoids). A few changes in your diet and lifestyle may help you avoid ongoing constipation. Your doctor may also prescribe medicine to help loosen your stool. Some medicines can cause constipation. These include pain medicines and antidepressants. Tell your doctor about all the medicines you take. Your doctor may want to make a medicine change to ease your symptoms. Follow-up care is a key part of your treatment and safety. Be sure to make and go to all appointments, and call your doctor if you are having problems. It's also a good idea to know your test results and keep a list of the medicines you take. How can you care for yourself at home? · Drink plenty of fluids, enough so that your urine is light yellow or clear like water. If you have kidney, heart, or liver disease and have to limit fluids, talk with your doctor before you increase the amount of fluids you drink. · Include high-fiber foods in your diet each day. These include fruits, vegetables, beans, and whole grains. · Get at least 30 minutes of exercise on most days of the week. Walking is a good choice. You also may want to do other activities, such as running, swimming, cycling, or playing tennis or team sports. · Take a fiber supplement, such as Citrucel or Metamucil, every day. Read and follow all instructions on the label. · Schedule time each day for a bowel movement. A daily routine may help.  Take your time having your bowel movement. · Support your feet with a small step stool when you sit on the toilet. This helps flex your hips and places your pelvis in a squatting position. · Your doctor may recommend an over-the-counter laxative to relieve your constipation. Examples are Milk of Magnesia and MiraLax. Read and follow all instructions on the label. Do not use laxatives on a long-term basis. When should you call for help? Call your doctor now or seek immediate medical care if:    · You have new or worse belly pain.     · You have new or worse nausea or vomiting.     · You have blood in your stools.    Watch closely for changes in your health, and be sure to contact your doctor if:    · Your constipation is getting worse.     · You do not get better as expected. Where can you learn more? Go to http://vivian-millicent.info/. Enter 21 [id number] in the search box to learn more about \"Constipation: Care Instructions. \"  Current as of: September 23, 2018  Content Version: 11.9  © 7851-6057 [employer]. Care instructions adapted under license by [employer]at (which disclaims liability or warranty for this information). If you have questions about a medical condition or this instruction, always ask your healthcare professional. [place] [address] any warranty or liability for your use of this information. Patient Education        Hyponatremia: Care Instructions  Your Care Instructions  Hyponatremia (say \"cc-jl-cik-TREE-hernan-uh\") means that you don't have enough sodium in your blood. It can cause nausea, vomiting, and headaches. Or you may not feel hungry. In serious cases, it can cause seizures, a coma, or even death. Hyponatremia is not a disease. It is a problem caused by something else, such as medicines or exercising for a long time in hot weather.   You can get hyponatremia if you lose a lot of fluids and then you drink a lot of water or other liquids that don't have much sodium. You can also get it if you have kidney, liver, heart, or other health problems. Treatment is focused on getting your sodium levels back to normal.  Follow-up care is a key part of your treatment and safety. Be sure to make and go to all appointments, and call your doctor if you are having problems. It's also a good idea to know your test results and keep a list of the medicines you take. How can you care for yourself at home? · If your doctor recommends it, drink fluids that have sodium. Sports drinks are a good choice. Or you can eat salty foods. · If your doctor recommends it, limit the amount of water you drink. And limit fluids that are mostly water. These include tea, coffee, and juice. · Take your medicines exactly as prescribed. Call your doctor if you have any problems with your medicine. · Get your sodium levels tested when your doctor tells you to. When should you call for help? Call 911 anytime you think you may need emergency care. For example, call if:    · You have a seizure.     · You passed out (lost consciousness).    Call your doctor now or seek immediate medical care if:    · You are confused or it is hard to focus.     · You have little or no appetite.     · You feel sick to your stomach or you vomit.     · You have a headache.     · You have mood changes.     · You feel more tired than usual.    Watch closely for changes in your health, and be sure to contact your doctor if:    · You do not get better as expected. Where can you learn more? Go to http://vivian-millicent.info/. Enter M632 in the search box to learn more about \"Hyponatremia: Care Instructions. \"  Current as of: June 25, 2018  Content Version: 11.9  © 7228-8418 [employer]. Care instructions adapted under license by [employer] (which disclaims liability or warranty for this information).  If you have questions about a medical condition or this instruction, always ask your healthcare professional. [address] any warranty or liability for your use of this information.

## 2019-04-12 NOTE — ED NOTES
Patient presents after not being able to have a BM for the past week. Patient states she is on Tramadol. Per patient she has taken suppositories, miralax and no relief.

## 2019-04-12 NOTE — ED NOTES
Pt discharged by Dr. Michael Sun. Pt provided with discharge instructions Rx and instructions on follow up care. Pt out of ED in stable condition accompanied by family.

## 2019-04-12 NOTE — ED PROVIDER NOTES
EMERGENCY DEPARTMENT HISTORY AND PHYSICAL EXAM 
     
 
Date: 4/12/2019 Patient Name: Anson Newberry History of Presenting Illness Chief Complaint Patient presents with  Constipation  
  last BM a week ago; pt taking tramadol for back fx; pt has tried laxatives and suppositories History Provided By: Patient and Patient's Daughter HPI: Anson Newberry is a 80 y.o. female, pmhx COPD, hypertension, arthritis, who presents dilatory to the ED c/o abdominal pain and constipation with>7 days no output. On tramadol for arthiritis, increased today for continued pain. Hx COPD also notes mild SOB. She notes her appetite has been normal she is been eating and drinking well although when she mentions her diet seems to be a bunch of sandwiches without any true sustenance. Patient specifically denies any recent fevers, chills, nausea, vomiting, diarrhea, abd pain, CP, SOB, urinary sxs, changes in BM, or headache. PCP: Corie Frost DO Allergies: None known Social Hx: Daily tobacco, occasional EtOH, none illicit Drugs There are no other complaints, changes, or physical findings at this time. Current Outpatient Medications Medication Sig Dispense Refill  traMADol (ULTRAM) 50 mg tablet Take 100 mg by mouth every six (6) hours as needed for Pain.  magnesium citrate solution Drink one bottle. If you have not had large stool output, drink the second bottle. 2 Bottle 0  
 Nebulizer & Compressor machine 1 Each by Does Not Apply route every four (4) hours as needed. As directed 1 Each 0  
 albuterol (ACCUNEB) 1.25 mg/3 mL nebu Take 3 mL by inhalation every four (4) hours as needed (wheezing). 25 Each 0  
 PSYLLIUM SEED, WITH SUGAR, (METAMUCIL PO) Take  by mouth as needed.  atorvastatin (LIPITOR) 20 mg tablet Take  by mouth daily.  levothyroxine (SYNTHROID) 100 mcg tablet Take  by mouth Daily (before breakfast).  lisinopril (PRINIVIL, ZESTRIL) 40 mg tablet Take 40 mg by mouth daily.  albuterol (PROVENTIL HFA, VENTOLIN HFA) 90 mcg/actuation inhaler Take 1 Puff by inhalation.  hydrochlorothiazide (HYDRODIURIL) 25 mg tablet Take 25 mg by mouth daily.  calcium-cholecalciferol, d3, (CALCIUM 600 + D) 600-125 mg-unit Tab Take  by mouth.  omega-3 fatty acids-vitamin e (FISH OIL) 1,000 mg Cap Take 1 Cap by mouth. Past History Past Medical History: 
Past Medical History:  
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 
Past Surgical History:  
Procedure Laterality Date  HX OOPHORECTOMY    
 RIGHT  HX THYROIDECTOMY PARTIAL Family History: 
Family History Problem Relation Age of Onset  Stroke Mother CEREBRAL HEMMORRHAGE  Cancer Father LUNG  
 Cancer Daughter LUNG/SPINE Social History: 
Social History Tobacco Use  Smoking status: Current Every Day Smoker Packs/day: 0.75 Years: 45.00 Pack years: 33.75  Smokeless tobacco: Never Used Substance Use Topics  Alcohol use: Yes Alcohol/week: 3.5 oz Types: 7 Cans of beer per week  Drug use: Never Allergies: 
No Known Allergies Review of Systems Review of Systems Constitutional: Negative for activity change, appetite change, chills, fever and unexpected weight change. HENT: Negative for congestion. Eyes: Negative for pain and visual disturbance. Respiratory: Negative for cough and shortness of breath. Cardiovascular: Negative for chest pain. Gastrointestinal: Negative for abdominal pain, diarrhea, nausea and vomiting. Genitourinary: Negative for dysuria. Musculoskeletal: Negative for back pain. Skin: Negative for rash. Neurological: Negative for headaches. Physical Exam  
Physical Exam  
Constitutional: She is oriented to person, place, and time. She appears well-developed and well-nourished. Thin elderly kyphotic female in moderate distress HENT:  
Head: Normocephalic and atraumatic. Mouth/Throat: Oropharynx is clear and moist.  
Eyes: Pupils are equal, round, and reactive to light. Conjunctivae and EOM are normal. Right eye exhibits no discharge. Left eye exhibits no discharge. Neck: Normal range of motion. Neck supple. Cardiovascular: Normal rate, regular rhythm and normal heart sounds. No murmur heard. Pulmonary/Chest: Effort normal and breath sounds normal. No respiratory distress. She has no wheezes. She has no rales. Abdominal: Soft. Bowel sounds are normal. She exhibits distension (Mild diffuse distension). She exhibits no mass. There is no tenderness. There is no rebound and no guarding. Musculoskeletal: Normal range of motion. She exhibits no edema. Neurological: She is alert and oriented to person, place, and time. No cranial nerve deficit. She exhibits normal muscle tone. Skin: Skin is warm and dry. No rash noted. She is not diaphoretic. Nursing note and vitals reviewed. Diagnostic Study Results Labs - No results found for this or any previous visit (from the past 12 hour(s)). Radiologic Studies -  
XR ABD (KUB) Final Result IMPRESSION: Rectal constipation versus obstipation. CT Results  (Last 48 hours) None CXR Results  (Last 48 hours) None Medical Decision Making I am the first provider for this patient. I reviewed the vital signs, available nursing notes, past medical history, past surgical history, family history and social history. Vital Signs-Reviewed the patient's vital signs. No data found.  
 
Pulse Oximetry Analysis - 94% on RA 
 
 Cardiac Monitor:  
Rate: 85bpm 
Rhythm: Normal Sinus Rhythm Records Reviewed: Nursing Notes and Old Medical Records Provider Notes (Medical Decision Making): MDM: Elderly female presenting with constipation and no stool output for a week. Patient states she is not even passing gas. She has been eating and drinking normally and has had normal appetite without any reports of fevers, chills, nausea or vomiting; thus low suspicion for obstruction, obstipation given benign exam.  Lab analysis sent from triage notable for hyponatremia 128. Reviewing her history appears patient is chronically low likely secondary to poor intake. Discussed with patient and her daughter increased salt intake. ED Course:  
Initial assessment performed. The patients presenting problems have been discussed, and they are in agreement with the care plan formulated and outlined with them. I have encouraged them to ask questions as they arise throughout their visit. 3:20 AM  Dietary counseling. I have discussed with the patient appropriate water intake and dietary (macronutrient) intake as recommended by the AMA. We also discussed the risk of increased sugar intake, fat intake and inappropriate water intake as well as the risks associated with prolonged hyperglycemia and high cholesterol levels. PROGRESS NOTE: 
5 AM 
Pt with minimal output of brown water output after enema. Upon attempt of disimpaction patient's rectal vault appears empty except for 2 very small hard round pieces of stool. After disimpaction however there patient has had multiple episodes of flatulence with increasing water output. Discussed with her oral laxatives while she is here in the emergency room to which she agrees. Discharge note: 
[phone] Pt re-evaluated and noted to be feeling better, ready for discharge. Updated pt and family on all final lab and radiology findings.   Will follow up as instructed with her primary care physician for repeat lab check this week. All questions have been answered, pt voiced understanding and agreement with plan. Specific return precautions provided as well as instructions to return to the ED should sx worsen at any time. Vital signs stable for discharge. Critical Care Time:  
0 Diagnosis Clinical Impression: 1. Constipation, unspecified constipation type 2. Dehydration 3. Hyponatremia PLAN: 
1. Discharge Medication List as of 4/12/2019  6:26 AM  
  
START taking these medications Details  
magnesium citrate solution Drink one bottle. If you have not had large stool output, drink the second bottle., Normal, Disp-2 Bottle, R-0  
  
  
CONTINUE these medications which have NOT CHANGED Details  
traMADol (ULTRAM) 50 mg tablet Take 100 mg by mouth every six (6) hours as needed for Pain., Historical Med Nebulizer & Compressor machine 1 Each by Does Not Apply route every four (4) hours as needed. As directed, Print, Disp-1 Each, R-0  
  
albuterol (ACCUNEB) 1.25 mg/3 mL nebu Take 3 mL by inhalation every four (4) hours as needed (wheezing). , Print, Disp-25 Each, R-0  
  
PSYLLIUM SEED, WITH SUGAR, (METAMUCIL PO) Take  by mouth as needed. Historical Med  
  
atorvastatin (LIPITOR) 20 mg tablet Take  by mouth daily. Historical Med  
  
levothyroxine (SYNTHROID) 100 mcg tablet Take  by mouth Daily (before breakfast). Historical Med  
  
lisinopril (PRINIVIL, ZESTRIL) 40 mg tablet Take 40 mg by mouth daily. Historical Med, 40 mg  
  
albuterol (PROVENTIL HFA, VENTOLIN HFA) 90 mcg/actuation inhaler Take 1 Puff by inhalation. Historical Med, 1 Puff  
  
hydrochlorothiazide (HYDRODIURIL) 25 mg tablet Take 25 mg by mouth daily. Historical Med, 25 mg  
  
calcium-cholecalciferol, d3, (CALCIUM 600 + D) 600-125 mg-unit Tab Take  by mouth.   Historical Med  
  
 omega-3 fatty acids-vitamin e (FISH OIL) 1,000 mg Cap Take 1 Cap by mouth. Historical Med, 1 Cap 2. Follow-up Information Follow up With Specialties Details Why Contact Info Radha Gregg DO Family Practice Schedule an appointment as soon as possible for a visit For rehceck of symptoms and electrolytes Monday [address] [facility] [address] [phone] [facility] EMERGENCY DEPT Emergency Medicine  If symptoms worsen [address] [facility] 
[phone] Return to ED if worse Disposition: 
Home

## 2019-04-18 ENCOUNTER — HOSPITAL ENCOUNTER (INPATIENT)
Age: 84
LOS: 2 days | Discharge: HOME HEALTH | DRG: 543 | End: 2019-04-20
Attending: EMERGENCY MEDICINE | Admitting: INTERNAL MEDICINE
Payer: MEDICARE

## 2019-04-18 ENCOUNTER — APPOINTMENT (OUTPATIENT)
Dept: GENERAL RADIOLOGY | Age: 84
DRG: 543 | End: 2019-04-18
Attending: NURSE PRACTITIONER
Payer: MEDICARE

## 2019-04-18 ENCOUNTER — APPOINTMENT (OUTPATIENT)
Dept: CT IMAGING | Age: 84
DRG: 543 | End: 2019-04-18
Attending: NURSE PRACTITIONER
Payer: MEDICARE

## 2019-04-18 DIAGNOSIS — M54.9 INTRACTABLE BACK PAIN: ICD-10-CM

## 2019-04-18 DIAGNOSIS — M48.50XA COMPRESSION FRACTURE OF VERTEBRA (HCC): Primary | ICD-10-CM

## 2019-04-18 LAB
ALBUMIN SERPL-MCNC: 3.8 G/DL (ref 3.5–5)
ALBUMIN/GLOB SERPL: 1.4 {RATIO} (ref 1.1–2.2)
ALP SERPL-CCNC: 97 U/L (ref 45–117)
ALT SERPL-CCNC: 31 U/L (ref 12–78)
ANION GAP SERPL CALC-SCNC: 8 MMOL/L (ref 5–15)
APPEARANCE UR: CLEAR
AST SERPL-CCNC: 24 U/L (ref 15–37)
BACTERIA URNS QL MICRO: NEGATIVE /HPF
BASOPHILS # BLD: 0 K/UL (ref 0–0.1)
BASOPHILS NFR BLD: 0 % (ref 0–1)
BILIRUB SERPL-MCNC: 0.8 MG/DL (ref 0.2–1)
BILIRUB UR QL: NEGATIVE
BUN SERPL-MCNC: 13 MG/DL (ref 6–20)
BUN/CREAT SERPL: 17 (ref 12–20)
CALCIUM SERPL-MCNC: 8.6 MG/DL (ref 8.5–10.1)
CHLORIDE SERPL-SCNC: 94 MMOL/L (ref 97–108)
CO2 SERPL-SCNC: 26 MMOL/L (ref 21–32)
COLOR UR: ABNORMAL
CREAT SERPL-MCNC: 0.78 MG/DL (ref 0.55–1.02)
DIFFERENTIAL METHOD BLD: ABNORMAL
EOSINOPHIL # BLD: 0 K/UL (ref 0–0.4)
EOSINOPHIL NFR BLD: 0 % (ref 0–7)
EPITH CASTS URNS QL MICRO: ABNORMAL /LPF
ERYTHROCYTE [DISTWIDTH] IN BLOOD BY AUTOMATED COUNT: 19.5 % (ref 11.5–14.5)
GLOBULIN SER CALC-MCNC: 2.8 G/DL (ref 2–4)
GLUCOSE SERPL-MCNC: 96 MG/DL (ref 65–100)
GLUCOSE UR STRIP.AUTO-MCNC: NEGATIVE MG/DL
HCT VFR BLD AUTO: 33.5 % (ref 35–47)
HGB BLD-MCNC: 11.3 G/DL (ref 11.5–16)
HGB UR QL STRIP: NEGATIVE
HYALINE CASTS URNS QL MICRO: ABNORMAL /LPF (ref 0–5)
IMM GRANULOCYTES # BLD AUTO: 0.1 K/UL (ref 0–0.04)
IMM GRANULOCYTES NFR BLD AUTO: 1 % (ref 0–0.5)
KETONES UR QL STRIP.AUTO: NEGATIVE MG/DL
LEUKOCYTE ESTERASE UR QL STRIP.AUTO: ABNORMAL
LIPASE SERPL-CCNC: 70 U/L (ref 73–393)
LYMPHOCYTES # BLD: 2.2 K/UL (ref 0.8–3.5)
LYMPHOCYTES NFR BLD: 25 % (ref 12–49)
MCH RBC QN AUTO: 31.5 PG (ref 26–34)
MCHC RBC AUTO-ENTMCNC: 33.7 G/DL (ref 30–36.5)
MCV RBC AUTO: 93.3 FL (ref 80–99)
MONOCYTES # BLD: 1.3 K/UL (ref 0–1)
MONOCYTES NFR BLD: 15 % (ref 5–13)
NEUTS SEG # BLD: 5.4 K/UL (ref 1.8–8)
NEUTS SEG NFR BLD: 59 % (ref 32–75)
NITRITE UR QL STRIP.AUTO: NEGATIVE
NRBC # BLD: 0.03 K/UL (ref 0–0.01)
NRBC BLD-RTO: 0.3 PER 100 WBC
PH UR STRIP: 6 [PH] (ref 5–8)
PLATELET # BLD AUTO: 245 K/UL (ref 150–400)
PMV BLD AUTO: 9.9 FL (ref 8.9–12.9)
POTASSIUM SERPL-SCNC: 3.8 MMOL/L (ref 3.5–5.1)
PROT SERPL-MCNC: 6.6 G/DL (ref 6.4–8.2)
PROT UR STRIP-MCNC: NEGATIVE MG/DL
RBC # BLD AUTO: 3.59 M/UL (ref 3.8–5.2)
RBC #/AREA URNS HPF: ABNORMAL /HPF (ref 0–5)
SODIUM SERPL-SCNC: 128 MMOL/L (ref 136–145)
SP GR UR REFRACTOMETRY: 1.01 (ref 1–1.03)
UROBILINOGEN UR QL STRIP.AUTO: 1 EU/DL (ref 0.2–1)
WBC # BLD AUTO: 9 K/UL (ref 3.6–11)
WBC URNS QL MICRO: ABNORMAL /HPF (ref 0–4)

## 2019-04-18 PROCEDURE — 74011250637 HC RX REV CODE- 250/637: Performed by: INTERNAL MEDICINE

## 2019-04-18 PROCEDURE — 99285 EMERGENCY DEPT VISIT HI MDM: CPT

## 2019-04-18 PROCEDURE — 74011000250 HC RX REV CODE- 250: Performed by: INTERNAL MEDICINE

## 2019-04-18 PROCEDURE — 74011250637 HC RX REV CODE- 250/637: Performed by: NURSE PRACTITIONER

## 2019-04-18 PROCEDURE — 81001 URINALYSIS AUTO W/SCOPE: CPT

## 2019-04-18 PROCEDURE — 36415 COLL VENOUS BLD VENIPUNCTURE: CPT

## 2019-04-18 PROCEDURE — 72128 CT CHEST SPINE W/O DYE: CPT

## 2019-04-18 PROCEDURE — 71046 X-RAY EXAM CHEST 2 VIEWS: CPT

## 2019-04-18 PROCEDURE — 74011636320 HC RX REV CODE- 636/320: Performed by: EMERGENCY MEDICINE

## 2019-04-18 PROCEDURE — 80053 COMPREHEN METABOLIC PANEL: CPT

## 2019-04-18 PROCEDURE — 74177 CT ABD & PELVIS W/CONTRAST: CPT

## 2019-04-18 PROCEDURE — 77030038269 HC DRN EXT URIN PURWCK BARD -A

## 2019-04-18 PROCEDURE — 65270000029 HC RM PRIVATE

## 2019-04-18 PROCEDURE — 83690 ASSAY OF LIPASE: CPT

## 2019-04-18 PROCEDURE — 85025 COMPLETE CBC W/AUTO DIFF WBC: CPT

## 2019-04-18 RX ORDER — GLUCOSAM/CHONDRO/HERB 149/HYAL 750-100 MG
1 TABLET ORAL DAILY
Status: DISCONTINUED | OUTPATIENT
Start: 2019-04-19 | End: 2019-04-20 | Stop reason: HOSPADM

## 2019-04-18 RX ORDER — ALBUTEROL SULFATE 0.83 MG/ML
2.5 SOLUTION RESPIRATORY (INHALATION)
Status: DISCONTINUED | OUTPATIENT
Start: 2019-04-18 | End: 2019-04-20 | Stop reason: HOSPADM

## 2019-04-18 RX ORDER — SORBITOL SOLUTION 70 %
30 SOLUTION, ORAL MISCELLANEOUS
Status: DISPENSED | OUTPATIENT
Start: 2019-04-18 | End: 2019-04-19

## 2019-04-18 RX ORDER — SODIUM CHLORIDE 0.9 % (FLUSH) 0.9 %
5-40 SYRINGE (ML) INJECTION AS NEEDED
Status: DISCONTINUED | OUTPATIENT
Start: 2019-04-18 | End: 2019-04-20 | Stop reason: HOSPADM

## 2019-04-18 RX ORDER — FERROUS SULFATE, DRIED 160(50) MG
1 TABLET, EXTENDED RELEASE ORAL
Status: DISCONTINUED | OUTPATIENT
Start: 2019-04-19 | End: 2019-04-20 | Stop reason: HOSPADM

## 2019-04-18 RX ORDER — SODIUM CHLORIDE 0.9 % (FLUSH) 0.9 %
10 SYRINGE (ML) INJECTION
Status: COMPLETED | OUTPATIENT
Start: 2019-04-18 | End: 2019-04-18

## 2019-04-18 RX ORDER — OXYCODONE AND ACETAMINOPHEN 5; 325 MG/1; MG/1
1 TABLET ORAL
Status: COMPLETED | OUTPATIENT
Start: 2019-04-18 | End: 2019-04-18

## 2019-04-18 RX ORDER — ATORVASTATIN CALCIUM 20 MG/1
20 TABLET, FILM COATED ORAL DAILY
Status: DISCONTINUED | OUTPATIENT
Start: 2019-04-19 | End: 2019-04-20 | Stop reason: HOSPADM

## 2019-04-18 RX ORDER — MORPHINE SULFATE 2 MG/ML
2 INJECTION, SOLUTION INTRAMUSCULAR; INTRAVENOUS
Status: DISCONTINUED | OUTPATIENT
Start: 2019-04-18 | End: 2019-04-18

## 2019-04-18 RX ORDER — LISINOPRIL 20 MG/1
40 TABLET ORAL DAILY
Status: DISCONTINUED | OUTPATIENT
Start: 2019-04-19 | End: 2019-04-20 | Stop reason: HOSPADM

## 2019-04-18 RX ORDER — DOCUSATE SODIUM 100 MG/1
100 CAPSULE, LIQUID FILLED ORAL 2 TIMES DAILY
Status: DISCONTINUED | OUTPATIENT
Start: 2019-04-19 | End: 2019-04-20 | Stop reason: HOSPADM

## 2019-04-18 RX ORDER — ONDANSETRON 2 MG/ML
4 INJECTION INTRAMUSCULAR; INTRAVENOUS
Status: DISCONTINUED | OUTPATIENT
Start: 2019-04-18 | End: 2019-04-20 | Stop reason: HOSPADM

## 2019-04-18 RX ORDER — ACETAMINOPHEN 325 MG/1
650 TABLET ORAL 4 TIMES DAILY
Status: DISCONTINUED | OUTPATIENT
Start: 2019-04-18 | End: 2019-04-20 | Stop reason: HOSPADM

## 2019-04-18 RX ORDER — POLYETHYLENE GLYCOL 3350 17 G/17G
17 POWDER, FOR SOLUTION ORAL DAILY
Status: DISCONTINUED | OUTPATIENT
Start: 2019-04-19 | End: 2019-04-20 | Stop reason: HOSPADM

## 2019-04-18 RX ORDER — OXYCODONE AND ACETAMINOPHEN 5; 325 MG/1; MG/1
1 TABLET ORAL
Status: DISCONTINUED | OUTPATIENT
Start: 2019-04-18 | End: 2019-04-20 | Stop reason: HOSPADM

## 2019-04-18 RX ORDER — SODIUM CHLORIDE 0.9 % (FLUSH) 0.9 %
5-40 SYRINGE (ML) INJECTION EVERY 8 HOURS
Status: DISCONTINUED | OUTPATIENT
Start: 2019-04-18 | End: 2019-04-20 | Stop reason: HOSPADM

## 2019-04-18 RX ORDER — HEPARIN SODIUM 5000 [USP'U]/ML
5000 INJECTION, SOLUTION INTRAVENOUS; SUBCUTANEOUS EVERY 12 HOURS
Status: DISCONTINUED | OUTPATIENT
Start: 2019-04-19 | End: 2019-04-20 | Stop reason: HOSPADM

## 2019-04-18 RX ORDER — LEVOTHYROXINE SODIUM 100 UG/1
100 TABLET ORAL
Status: DISCONTINUED | OUTPATIENT
Start: 2019-04-19 | End: 2019-04-20 | Stop reason: HOSPADM

## 2019-04-18 RX ORDER — HYDROCHLOROTHIAZIDE 25 MG/1
25 TABLET ORAL DAILY
Status: DISCONTINUED | OUTPATIENT
Start: 2019-04-19 | End: 2019-04-19

## 2019-04-18 RX ORDER — IPRATROPIUM BROMIDE AND ALBUTEROL SULFATE 2.5; .5 MG/3ML; MG/3ML
3 SOLUTION RESPIRATORY (INHALATION) EVERY 12 HOURS
Status: DISCONTINUED | OUTPATIENT
Start: 2019-04-18 | End: 2019-04-20 | Stop reason: HOSPADM

## 2019-04-18 RX ORDER — MORPHINE SULFATE 2 MG/ML
1 INJECTION, SOLUTION INTRAMUSCULAR; INTRAVENOUS
Status: DISCONTINUED | OUTPATIENT
Start: 2019-04-18 | End: 2019-04-20 | Stop reason: HOSPADM

## 2019-04-18 RX ADMIN — IOPAMIDOL 100 ML: 755 INJECTION, SOLUTION INTRAVENOUS at 17:48

## 2019-04-18 RX ADMIN — Medication 10 ML: at 17:48

## 2019-04-18 RX ADMIN — OXYCODONE AND ACETAMINOPHEN 1 TABLET: 5; 325 TABLET ORAL at 19:04

## 2019-04-18 RX ADMIN — ACETAMINOPHEN 650 MG: 325 TABLET ORAL at 22:19

## 2019-04-18 RX ADMIN — IPRATROPIUM BROMIDE AND ALBUTEROL SULFATE 3 ML: .5; 3 SOLUTION RESPIRATORY (INHALATION) at 22:19

## 2019-04-18 NOTE — CONSULTS
ORTHOPAEDIC CONSULT NOTE    Subjective:     Date of Consultation:  April 18, 0096      Lacey Flynn is a 80 y.o. female who is being seen for irretractable back pain. Pt reports the pain started in her mid back 3 weeks ago after she twisted while removing her curtains to wash. Pt was seen at [facility] with diagnosis of T spine compression fracture and started on tramadol and calcitonin with min relief that has limited her mobility that caused her daughter to bring her to ED, she has outpt Ct and bone scan ordered for 4/19. Pt denies extremity numbness/tingling, bowel/ bladder incontinence. No other injuries or falls. Patient Active Problem List    Diagnosis Date Noted    Breast cancer ([facility] 04/04/2013     Priority: 1 - One     Class: Stage 1    Intraductal papilloma of breast 03/21/2013    HTN (hypertension) 07/11/2012    Dyslipidemia 07/11/2012    Chronic airway obstruction, not elsewhere classified 07/11/2012    Carotid artery calcification 07/11/2012    Tobacco use disorder 07/11/2012     Family History   Problem Relation Age of Onset    Stroke Mother         CEREBRAL HEMMORRHAGE    Cancer Father         LUNG    Cancer Daughter         LUNG/SPINE      Social History     Tobacco Use    Smoking status: Current Every Day Smoker     Packs/day: 0.75     Years: 45.00     Pack years: 33.75    Smokeless tobacco: Never Used   Substance Use Topics    Alcohol use:  Yes     Alcohol/week: 3.5 oz     Types: 7 Cans of beer per week     Past Medical History:   Diagnosis Date    Breast cancer ([facility] 4/4/2013    Carotid artery calcification     Constipation     COPD (chronic obstructive pulmonary disease) (HCC)     Dyslipidemia     Hypertension     Intraductal papilloma of breast 3/21/2013    Psoriasis     Thyroid adenoma     Thyroid disease       Past Surgical History:   Procedure Laterality Date    HX OOPHORECTOMY      RIGHT    HX THYROIDECTOMY      PARTIAL      Prior to Admission medications Medication Sig Start Date End Date Taking? Authorizing Provider   traMADol (ULTRAM) 50 mg tablet Take 100 mg by mouth every six (6) hours as needed for Pain. Other, MD Artis   magnesium citrate solution Drink one bottle. If you have not had large stool output, drink the second bottle. 4/12/19   TermeerAngel MD   Nebulizer & Compressor machine 1 Each by Does Not Apply route every four (4) hours as needed. As directed 4/3/17   TermeerAngel MD   albuterol (ACCUNEB) 1.25 mg/3 mL nebu Take 3 mL by inhalation every four (4) hours as needed (wheezing). 4/3/17   Termeer, Angel Jeffers MD   PSYLLIUM SEED, WITH SUGAR, (METAMUCIL PO) Take  by mouth as needed. Provider, Historical   atorvastatin (LIPITOR) 20 mg tablet Take  by mouth daily. Provider, Historical   levothyroxine (SYNTHROID) 100 mcg tablet Take  by mouth Daily (before breakfast). Provider, Historical   lisinopril (PRINIVIL, ZESTRIL) 40 mg tablet Take 40 mg by mouth daily. Provider, Historical   albuterol (PROVENTIL HFA, VENTOLIN HFA) 90 mcg/actuation inhaler Take 1 Puff by inhalation. Provider, Historical   hydrochlorothiazide (HYDRODIURIL) 25 mg tablet Take 25 mg by mouth daily. Provider, Historical   calcium-cholecalciferol, d3, (CALCIUM 600 + D) 600-125 mg-unit Tab Take  by mouth. Provider, Historical   omega-3 fatty acids-vitamin e (FISH OIL) 1,000 mg Cap Take 1 Cap by mouth. Provider, Historical     Current Facility-Administered Medications   Medication Dose Route Frequency    sodium chloride (NS) flush 10 mL  10 mL IntraVENous RAD ONCE    iopamidol (ISOVUE-370) 76 % injection 100 mL  100 mL IntraVENous RAD ONCE     Current Outpatient Medications   Medication Sig    traMADol (ULTRAM) 50 mg tablet Take 100 mg by mouth every six (6) hours as needed for Pain.  magnesium citrate solution Drink one bottle. If you have not had large stool output, drink the second bottle.     Nebulizer & Compressor machine 1 Each by Does Not Apply route every four (4) hours as needed. As directed    albuterol (ACCUNEB) 1.25 mg/3 mL nebu Take 3 mL by inhalation every four (4) hours as needed (wheezing).  PSYLLIUM SEED, WITH SUGAR, (METAMUCIL PO) Take  by mouth as needed.  atorvastatin (LIPITOR) 20 mg tablet Take  by mouth daily.  levothyroxine (SYNTHROID) 100 mcg tablet Take  by mouth Daily (before breakfast).  lisinopril (PRINIVIL, ZESTRIL) 40 mg tablet Take 40 mg by mouth daily.  albuterol (PROVENTIL HFA, VENTOLIN HFA) 90 mcg/actuation inhaler Take 1 Puff by inhalation.  hydrochlorothiazide (HYDRODIURIL) 25 mg tablet Take 25 mg by mouth daily.  calcium-cholecalciferol, d3, (CALCIUM 600 + D) 600-125 mg-unit Tab Take  by mouth.  omega-3 fatty acids-vitamin e (FISH OIL) 1,000 mg Cap Take 1 Cap by mouth. No Known Allergies     Review of Systems:  A comprehensive review of systems was negative except for that written in the HPI. Mental Status: no dementia    Objective:     Patient Vitals for the past 8 hrs:   BP Temp Pulse Resp SpO2 Height Weight   19 1541      5' (1.524 m) 59.9 kg (132 lb)   19 1533 123/74 98 °F (36.7 °C) 83 18 94 %       Temp (24hrs), Av °F (36.7 °C), Min:98 °F (36.7 °C), Max:98 °F (36.7 °C)      Gen: Well-developed,  in no acute distress    Musc: FROM of UE/LE, + TTP of lower T spine, NVI    Skin: No skin breakdown noted. Skin warm, pink, dry  Neuro: Cranial nerves are grossly intact, no focal motor weakness, follows commands appropriately   Psych: Good insight, oriented to person, place and time, alert    Imaging Review: pending     Labs: No results found for this or any previous visit (from the past 24 hour(s)).       Impression:     Patient Active Problem List    Diagnosis Date Noted    Breast cancer ([facility] [address] 2013     Priority: 1 - One     Class: Stage 1    Intraductal papilloma of breast 2013    HTN (hypertension) 2012  Dyslipidemia 07/11/2012    Chronic airway obstruction, not elsewhere classified 07/11/2012    Carotid artery calcification 07/11/2012    Tobacco use disorder 07/11/2012     Active Problems:    * No active hospital problems. *      Plan:   -  Pt is stable orthopaedically, Non-Operative management at this time  -  Admit to medicine if needed for pain management, up as tolerated   -  CT and bone scan as planned, will follow up for results and plan for kyphoplasty  -  Will inform Dr. Mariano Calderon of admission     Dr. Laws Limb aware and agrees with plan as above.         Bertrand Foss, NP  Orthopedic Nurse Practitioner   [place]

## 2019-04-18 NOTE — ED PROVIDER NOTES
EMERGENCY DEPARTMENT HISTORY AND PHYSICAL EXAM 
 
 
Date: 4/18/2019 Patient Name: Melissa Garcia History of Presenting Illness Chief Complaint Patient presents with  Back Pain  
  pt presents to ED with complaint of back pain. pt has lower back fx and was scheduled to have surgery tomorrow but was unable to stand the pain any longer History Provided By: Patient HPI: Melissa Garcia, 80 y.o. female with PMHx significant for hypertension, COPD, dyslipidemia, breast cancer, shortness of breath due to ongoing tobacco use disorder, presents via EMS to the ED with cc of intractable back pain that has been progressive for the last 3 weeks. 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 She was able to catch herself but did twist in an abnormal position. She was seen by the orthopedic specialist 3 weeks ago and I T9 compression fracture. She has been taking tramadol for pain without relief. There has been no further fall injury or obvious trauma. She was supposed to have a CT of the thoracic spine as well as a bone scan tomorrow to determine if she is eligible for a kyphoplasty. She is no longer to able to ambulate up at home and her family is not able to care for her. Shortness of breath is asked is currently at baseline. She does endorse generalized abdominal pain. Pt denies fevers, chills, night sweats, chest pain, pressure, n/v/d, melena, hematuria, dysuria, constipation, HA, dizziness, and syncope There are no other complaints, changes, or physical findings at this time. PCP: Shawnee Oglesby, DO No current facility-administered medications on file prior to encounter. Current Outpatient Medications on File Prior to Encounter Medication Sig Dispense Refill  traMADol (ULTRAM) 50 mg tablet Take 100 mg by mouth every six (6) hours as needed for Pain.  magnesium citrate solution Drink one bottle. If you have not had large stool output, drink the second bottle. 2 Bottle 0  
 Nebulizer & Compressor machine 1 Each by Does Not Apply route every four (4) hours as needed. As directed 1 Each 0  
 albuterol (ACCUNEB) 1.25 mg/3 mL nebu Take 3 mL by inhalation every four (4) hours as needed (wheezing). 25 Each 0  
 PSYLLIUM SEED, WITH SUGAR, (METAMUCIL PO) Take  by mouth as needed.  atorvastatin (LIPITOR) 20 mg tablet Take  by mouth daily.  levothyroxine (SYNTHROID) 100 mcg tablet Take  by mouth Daily (before breakfast).  lisinopril (PRINIVIL, ZESTRIL) 40 mg tablet Take 40 mg by mouth daily.  albuterol (PROVENTIL HFA, VENTOLIN HFA) 90 mcg/actuation inhaler Take 1 Puff by inhalation.  hydrochlorothiazide (HYDRODIURIL) 25 mg tablet Take 25 mg by mouth daily.  calcium-cholecalciferol, d3, (CALCIUM 600 + D) 600-125 mg-unit Tab Take  by mouth.  omega-3 fatty acids-vitamin e (FISH OIL) 1,000 mg Cap Take 1 Cap by mouth. Past History Past Medical History: 
Past Medical History:  
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 
Past Surgical History:  
Procedure Laterality Date  HX OOPHORECTOMY    
 RIGHT  HX THYROIDECTOMY PARTIAL Family History: 
Family History Problem Relation Age of Onset  Stroke Mother CEREBRAL HEMMORRHAGE  Cancer Father LUNG  
 Cancer Daughter LUNG/SPINE Social History: 
Social History Tobacco Use  Smoking status: Current Every Day Smoker Packs/day: 0.75 Years: 45.00 Pack years: 33.75  Smokeless tobacco: Never Used Substance Use Topics  Alcohol use: Yes Alcohol/week: 3.5 oz Types: 7 Cans of beer per week  Drug use: Never Allergies: 
No Known Allergies Review of Systems Review of Systems Constitutional: Negative for activity change, appetite change, chills, diaphoresis, fatigue, fever and unexpected weight change. HENT: Negative for congestion, ear pain, rhinorrhea, sinus pressure, sore throat and tinnitus. Eyes: Negative for photophobia, pain, discharge and visual disturbance. Respiratory: Positive for shortness of breath. Negative for apnea, cough, choking, chest tightness, wheezing and stridor. Cardiovascular: Negative for chest pain, palpitations and leg swelling. Gastrointestinal: Negative for abdominal pain, constipation, diarrhea, nausea and vomiting. Endocrine: Negative for polydipsia, polyphagia and polyuria. Genitourinary: Negative for decreased urine volume, dyspareunia, dysuria, enuresis, flank pain, frequency, hematuria and urgency. Musculoskeletal: Positive for back pain. Negative for arthralgias, gait problem, myalgias and neck pain. Skin: Negative for color change, pallor, rash and wound. Allergic/Immunologic: Negative for immunocompromised state. Neurological: Negative for dizziness, seizures, syncope, weakness, light-headedness and headaches. Hematological: Does not bruise/bleed easily. Psychiatric/Behavioral: Negative for agitation and confusion. The patient is not nervous/anxious. Physical Exam  
Physical Exam  
Constitutional: She is oriented to person, place, and time. She appears well-developed and well-nourished. No distress. HENT:  
Head: Normocephalic. Right Ear: External ear normal.  
Left Ear: External ear normal.  
Mouth/Throat: Oropharynx is clear and moist. No oropharyngeal exudate. Eyes: Pupils are equal, round, and reactive to light. Conjunctivae and EOM are normal. Right eye exhibits no discharge.  Left eye exhibits no discharge. No scleral icterus. Neck: Normal range of motion. Neck supple. No JVD present. No tracheal deviation present. No thyromegaly present. Cardiovascular: Normal rate, regular rhythm, normal heart sounds and intact distal pulses. Exam reveals no gallop and no friction rub. No murmur heard. Pulmonary/Chest: Effort normal and breath sounds normal. No stridor. No respiratory distress. She has no wheezes. She has no rales. She exhibits no tenderness. Abdominal: Soft. Bowel sounds are normal. She exhibits no distension and no mass. There is no tenderness. There is no rebound and no guarding. Musculoskeletal: Normal range of motion. She exhibits no edema or tenderness. Lymphadenopathy:  
  She has no cervical adenopathy. Neurological: She is alert and oriented to person, place, and time. She displays normal reflexes. No cranial nerve deficit. Coordination normal.  
Skin: Skin is warm and dry. No rash noted. She is not diaphoretic. No erythema. No pallor. Psychiatric: She has a normal mood and affect. Her behavior is normal. Judgment and thought content normal.  
Nursing note and vitals reviewed. Diagnostic Study Results Labs - Recent Results (from the past 12 hour(s)) METABOLIC PANEL, COMPREHENSIVE Collection Time: 04/18/19  4:58 PM  
Result Value Ref Range Sodium 128 (L) 136 - 145 mmol/L Potassium 3.8 3.5 - 5.1 mmol/L Chloride 94 (L) 97 - 108 mmol/L  
 CO2 26 21 - 32 mmol/L Anion gap 8 5 - 15 mmol/L Glucose 96 65 - 100 mg/dL BUN 13 6 - 20 MG/DL Creatinine 0.78 0.55 - 1.02 MG/DL  
 BUN/Creatinine ratio 17 12 - 20 GFR est AA >60 >60 ml/min/1.73m2 GFR est non-AA >60 >60 ml/min/1.73m2 Calcium 8.6 8.5 - 10.1 MG/DL Bilirubin, total 0.8 0.2 - 1.0 MG/DL  
 ALT (SGPT) 31 12 - 78 U/L  
 AST (SGOT) 24 15 - 37 U/L Alk. phosphatase 97 45 - 117 U/L Protein, total 6.6 6.4 - 8.2 g/dL Albumin 3.8 3.5 - 5.0 g/dL Globulin 2.8 2.0 - 4.0 g/dL A-G Ratio 1.4 1.1 - 2.2    
CBC WITH AUTOMATED DIFF Collection Time: 04/18/19  4:58 PM  
Result Value Ref Range WBC 9.0 3.6 - 11.0 K/uL  
 RBC 3.59 (L) 3.80 - 5.20 M/uL  
 HGB 11.3 (L) 11.5 - 16.0 g/dL HCT 33.5 (L) 35.0 - 47.0 % MCV 93.3 80.0 - 99.0 FL  
 MCH 31.5 26.0 - 34.0 PG  
 MCHC 33.7 30.0 - 36.5 g/dL  
 RDW 19.5 (H) 11.5 - 14.5 % PLATELET 001 967 - 228 K/uL MPV 9.9 8.9 - 12.9 FL  
 NRBC 0.3 (H) 0  WBC ABSOLUTE NRBC 0.03 (H) 0.00 - 0.01 K/uL NEUTROPHILS 59 32 - 75 % LYMPHOCYTES 25 12 - 49 % MONOCYTES 15 (H) 5 - 13 % EOSINOPHILS 0 0 - 7 % BASOPHILS 0 0 - 1 % IMMATURE GRANULOCYTES 1 (H) 0.0 - 0.5 % ABS. NEUTROPHILS 5.4 1.8 - 8.0 K/UL  
 ABS. LYMPHOCYTES 2.2 0.8 - 3.5 K/UL  
 ABS. MONOCYTES 1.3 (H) 0.0 - 1.0 K/UL  
 ABS. EOSINOPHILS 0.0 0.0 - 0.4 K/UL  
 ABS. BASOPHILS 0.0 0.0 - 0.1 K/UL  
 ABS. IMM. GRANS. 0.1 (H) 0.00 - 0.04 K/UL  
 DF AUTOMATED URINALYSIS W/MICROSCOPIC Collection Time: 04/18/19  4:58 PM  
Result Value Ref Range Color YELLOW/STRAW Appearance CLEAR CLEAR Specific gravity 1.013 1.003 - 1.030    
 pH (UA) 6.0 5.0 - 8.0 Protein NEGATIVE  NEG mg/dL Glucose NEGATIVE  NEG mg/dL Ketone NEGATIVE  NEG mg/dL Bilirubin NEGATIVE  NEG Blood NEGATIVE  NEG Urobilinogen 1.0 0.2 - 1.0 EU/dL Nitrites NEGATIVE  NEG Leukocyte Esterase SMALL (A) NEG    
 WBC 10-20 0 - 4 /hpf  
 RBC 0-5 0 - 5 /hpf Epithelial cells FEW FEW /lpf Bacteria NEGATIVE  NEG /hpf Hyaline cast 2-5 0 - 5 /lpf  
LIPASE Collection Time: 04/18/19  4:58 PM  
Result Value Ref Range Lipase 70 (L) 73 - 393 U/L Radiologic Studies -  
CT SPINE [facility] WO CONT Final Result IMPRESSION:   
1. Collapse of T9 with slight retropulsion. While new in the interval and  
remains age indeterminate. Gas within the superior aspect of the endplate favors  
against an acute injury.  If clinically indicated MR imaging would be required to  
 further evaluate CT ABD PELV W CONT Final Result IMPRESSION:  
No acute abnormality XR CHEST PA LAT Final Result Impression: 1. Hyperinflated lungs, no acute cardiopulmonary disease 2. Osteopenia. Interval collapse of approximately T9. Correlate with exam for  
pain CT Results  (Last 48 hours) 04/18/19 1748  [facility] CONT Final result Impression:  IMPRESSION:   
1. Collapse of T9 with slight retropulsion. While new in the interval and  
remains age indeterminate. Gas within the superior aspect of the endplate favors  
against an acute injury. If clinically indicated MR imaging would be required to  
further evaluate Narrative:  EXAM:  CT SPINE THORAC WO CONT INDICATION: Compression fracture. COMPARISON: 4/3/2017. TECHNIQUE:   
Multislice helical CT of the thoracic spine was performed. Sagittal and coronal  
reformations were generated. CT dose reduction was achieved through use of a  
standardized protocol tailored for this examination and automatic exposure  
control for dose modulation. FINDINGS:  
The alignment of the thoracic spine is normal.   
There is collapse of T9. There is gas within the disc space above and below this  
level with vacuum phenomenon within the disc. There is slight retropulsion. While new in the interval this is age indeterminate. There is a bony cleft  
within the vertebral body laterally on the left There is no spinal canal stenosis. 04/18/19 1748  CT ABD PELV W CONT Final result Impression:  IMPRESSION:  
No acute abnormality Narrative:  EXAM: CT ABD PELV W CONT INDICATION: abd pain COMPARISON: 4/26/2014 CONTRAST: 100 mL of Isovue-370. TECHNIQUE:   
Following the uneventful intravenous administration of contrast, thin axial  
images were obtained through the abdomen and pelvis.  Coronal and sagittal  
 reconstructions were generated. Oral contrast was not administered. CT dose  
reduction was achieved through use of a standardized protocol tailored for this  
examination and automatic exposure control for dose modulation. FINDINGS:   
LUNG BASES: Clear. INCIDENTALLY IMAGED HEART AND MEDIASTINUM: Unremarkable. LIVER: Multiple hypodensities within the liver. Largest is in the right hepatic  
lobe measures approximately 10 mm unchanged. GALLBLADDER: Small stones within a noninflamed gallbladder SPLEEN: No mass. PANCREAS: No mass or ductal dilatation. ADRENALS: Unremarkable. KIDNEYS: No mass, calculus, or hydronephrosis. Probable cyst right kidney STOMACH: Unremarkable. SMALL BOWEL: No dilatation or wall thickening. COLON: No dilatation or wall thickening. APPENDIX: Surgically absent PERITONEUM: No ascites or pneumoperitoneum. RETROPERITONEUM: No lymphadenopathy or aortic aneurysm. Extensive vascular  
calcifications REPRODUCTIVE ORGANS: Surgically absent URINARY BLADDER: No mass or calculus. BONES: No destructive bone lesion. Bones are osteopenic. Multilevel degenerative  
change. An acute fracture is not identified ADDITIONAL COMMENTS: N/A  
   
  
  
 
CXR Results  (Last 48 hours) 04/18/19 1729  XR CHEST PA LAT Final result Impression:  Impression: 1. Hyperinflated lungs, no acute cardiopulmonary disease 2. Osteopenia. Interval collapse of approximately T9. Correlate with exam for  
pain Narrative:  INDICATION:  sob Exam: Chest 2 views. Comparison: 4/3/2017. FINDINGS: Lung volumes remain hyperinflated. . Cardiomediastinal silhouette is  
normal. Pulmonary vasculature is not engorged. No focal parenchymal opacities,  
effusions, or pneumothorax. Bones are osteopenic with interval collapse of  
approximately T9. Medical Decision Making I am the first provider for this patient. I reviewed the vital signs, available nursing notes, past medical history, past surgical history, family history and social history. Vital Signs-Reviewed the patient's vital signs. Patient Vitals for the past 12 hrs: 
 Temp Pulse Resp BP SpO2  
04/18/19 1533 98 °F (36.7 °C) 83 18 123/74 94 % Pulse Oximetry Analysis - 94% on RA Cardiac Monitor:  
Rate: 83 bpm 
Rhythm: Normal Sinus Rhythm Records Reviewed: Nursing Notes, Old Medical Records, Previous electrocardiograms, Previous Radiology Studies and Previous Laboratory Studies Provider Notes (Medical Decision Making): Intractable back pain 
 
Routine laboratory data, UA, CT thoracic, abdomen and pelvis consult to orthopedics CONSULT NOTE:  
[address], NP spoke with Maryjo Wagner NP, Specialty: Marshall Maher Discussed pt's hx, disposition, and available diagnostic and imaging results. Reviewed care plans. Consultant agrees with plans as outlined. NP to bedside for evaluation. Josie Horner NP 
 
CONSULT NOTE:  
7:44 PM 
Josie Horner NP spoke with Dr. Cain Velazquez MD, Specialty: Hospitalist 
Discussed pt's hx, disposition, and available diagnostic and imaging results. Reviewed care plans. Consultant agrees with plans as outlined. MD to evaluate for admission. Josie Horner NP 
 
 
ED Course:  
Initial assessment performed. The patients presenting problems have been discussed, and they are in agreement with the care plan formulated and outlined with them. I have encouraged them to ask questions as they arise throughout their visit. Critical Care Time:  
0 Disposition: 
Admit to inpatient PLAN: 
7:44 PM 
Patient is being admitted to the hospital.  The results of their tests and reasons for their admission have been discussed with them and/or available family. They convey agreement and understanding for the need to be admitted and for their admission diagnosis.   Consultation has been made with the inpatient physician specialist for hospitalization. LABORATORY TESTS: 
Recent Results (from the past 12 hour(s)) METABOLIC PANEL, COMPREHENSIVE Collection Time: 04/18/19  4:58 PM  
Result Value Ref Range Sodium 128 (L) 136 - 145 mmol/L Potassium 3.8 3.5 - 5.1 mmol/L Chloride 94 (L) 97 - 108 mmol/L  
 CO2 26 21 - 32 mmol/L Anion gap 8 5 - 15 mmol/L Glucose 96 65 - 100 mg/dL BUN 13 6 - 20 MG/DL Creatinine 0.78 0.55 - 1.02 MG/DL  
 BUN/Creatinine ratio 17 12 - 20 GFR est AA >60 >60 ml/min/1.73m2 GFR est non-AA >60 >60 ml/min/1.73m2 Calcium 8.6 8.5 - 10.1 MG/DL Bilirubin, total 0.8 0.2 - 1.0 MG/DL  
 ALT (SGPT) 31 12 - 78 U/L  
 AST (SGOT) 24 15 - 37 U/L Alk. phosphatase 97 45 - 117 U/L Protein, total 6.6 6.4 - 8.2 g/dL Albumin 3.8 3.5 - 5.0 g/dL Globulin 2.8 2.0 - 4.0 g/dL A-G Ratio 1.4 1.1 - 2.2    
CBC WITH AUTOMATED DIFF Collection Time: 04/18/19  4:58 PM  
Result Value Ref Range WBC 9.0 3.6 - 11.0 K/uL  
 RBC 3.59 (L) 3.80 - 5.20 M/uL  
 HGB 11.3 (L) 11.5 - 16.0 g/dL HCT 33.5 (L) 35.0 - 47.0 % MCV 93.3 80.0 - 99.0 FL  
 MCH 31.5 26.0 - 34.0 PG  
 MCHC 33.7 30.0 - 36.5 g/dL  
 RDW 19.5 (H) 11.5 - 14.5 % PLATELET 667 880 - 657 K/uL MPV 9.9 8.9 - 12.9 FL  
 NRBC 0.3 (H) 0  WBC ABSOLUTE NRBC 0.03 (H) 0.00 - 0.01 K/uL NEUTROPHILS 59 32 - 75 % LYMPHOCYTES 25 12 - 49 % MONOCYTES 15 (H) 5 - 13 % EOSINOPHILS 0 0 - 7 % BASOPHILS 0 0 - 1 % IMMATURE GRANULOCYTES 1 (H) 0.0 - 0.5 % ABS. NEUTROPHILS 5.4 1.8 - 8.0 K/UL  
 ABS. LYMPHOCYTES 2.2 0.8 - 3.5 K/UL  
 ABS. MONOCYTES 1.3 (H) 0.0 - 1.0 K/UL  
 ABS. EOSINOPHILS 0.0 0.0 - 0.4 K/UL  
 ABS. BASOPHILS 0.0 0.0 - 0.1 K/UL  
 ABS. IMM. GRANS. 0.1 (H) 0.00 - 0.04 K/UL  
 DF AUTOMATED URINALYSIS W/MICROSCOPIC Collection Time: 04/18/19  4:58 PM  
Result Value Ref Range Color YELLOW/STRAW Appearance CLEAR CLEAR  Specific gravity 1.013 1.003 - 1.030    
 pH (UA) 6.0 5.0 - 8.0 Protein NEGATIVE  NEG mg/dL Glucose NEGATIVE  NEG mg/dL Ketone NEGATIVE  NEG mg/dL Bilirubin NEGATIVE  NEG Blood NEGATIVE  NEG Urobilinogen 1.0 0.2 - 1.0 EU/dL Nitrites NEGATIVE  NEG Leukocyte Esterase SMALL (A) NEG    
 WBC 10-20 0 - 4 /hpf  
 RBC 0-5 0 - 5 /hpf Epithelial cells FEW FEW /lpf Bacteria NEGATIVE  NEG /hpf Hyaline cast 2-5 0 - 5 /lpf  
LIPASE Collection Time: 04/18/19  4:58 PM  
Result Value Ref Range Lipase 70 (L) 73 - 393 U/L IMAGING RESULTS: 
CT SPINE THORAC WO CONT Final Result IMPRESSION:   
1. Collapse of T9 with slight retropulsion. While new in the interval and  
remains age indeterminate. Gas within the superior aspect of the endplate favors  
against an acute injury. If clinically indicated MR imaging would be required to  
further evaluate CT ABD PELV W CONT Final Result IMPRESSION:  
No acute abnormality XR CHEST PA LAT Final Result Impression: 1. Hyperinflated lungs, no acute cardiopulmonary disease 2. Osteopenia. Interval collapse of approximately T9. Correlate with exam for  
pain Xr Chest Pa Lat Result Date: 4/18/2019 INDICATION:  sob Exam: Chest 2 views. Comparison: 4/3/2017. FINDINGS: Lung volumes remain hyperinflated. . Cardiomediastinal silhouette is normal. Pulmonary vasculature is not engorged. No focal parenchymal opacities, effusions, or pneumothorax. Bones are osteopenic with interval collapse of approximately T9. Impression: 1. Hyperinflated lungs, no acute cardiopulmonary disease 2. Osteopenia. Interval collapse of approximately T9. Correlate with exam for pain Ct Spine Thorac Wo Cont Result Date: 4/18/2019 EXAM:  CT SPINE THORAC WO CONT INDICATION: Compression fracture. COMPARISON: 4/3/2017. TECHNIQUE: Multislice helical CT of the thoracic spine was performed. Sagittal and coronal reformations were generated.  CT dose reduction was achieved through use of a standardized protocol tailored for this examination and automatic exposure control for dose modulation. FINDINGS: The alignment of the thoracic spine is normal. There is collapse of T9. There is gas within the disc space above and below this level with vacuum phenomenon within the disc. There is slight retropulsion. While new in the interval this is age indeterminate. There is a bony cleft within the vertebral body laterally on the left There is no spinal canal stenosis. IMPRESSION: 1. Collapse of T9 with slight retropulsion. While new in the interval and remains age indeterminate. Gas within the superior aspect of the endplate favors against an acute injury. If clinically indicated MR imaging would be required to further evaluate Ct Abd Pelv W Cont Result Date: 4/18/2019 EXAM: CT ABD PELV W CONT INDICATION: abd pain COMPARISON: 4/26/2014 CONTRAST: 100 mL of Isovue-370. TECHNIQUE: Following the uneventful intravenous administration of contrast, thin axial images were obtained through the abdomen and pelvis. Coronal and sagittal reconstructions were generated. Oral contrast was not administered. CT dose reduction was achieved through use of a standardized protocol tailored for this examination and automatic exposure control for dose modulation. FINDINGS: LUNG BASES: Clear. INCIDENTALLY IMAGED HEART AND MEDIASTINUM: Unremarkable. LIVER: Multiple hypodensities within the liver. Largest is in the right hepatic lobe measures approximately 10 mm unchanged. GALLBLADDER: Small stones within a noninflamed gallbladder SPLEEN: No mass. PANCREAS: No mass or ductal dilatation. ADRENALS: Unremarkable. KIDNEYS: No mass, calculus, or hydronephrosis. Probable cyst right kidney STOMACH: Unremarkable. SMALL BOWEL: No dilatation or wall thickening. COLON: No dilatation or wall thickening. APPENDIX: Surgically absent PERITONEUM: No ascites or pneumoperitoneum.  RETROPERITONEUM: No lymphadenopathy or aortic aneurysm. Extensive vascular calcifications REPRODUCTIVE ORGANS: Surgically absent URINARY BLADDER: No mass or calculus. BONES: No destructive bone lesion. Bones are osteopenic. Multilevel degenerative change. An acute fracture is not identified ADDITIONAL COMMENTS: N/A IMPRESSION: No acute abnormality MEDICATIONS GIVEN: 
Medications  
cefTRIAXone (ROCEPHIN) 1 g in 0.9% sodium chloride (MBP/ADV) 50 mL (has no administration in time range)  
sodium chloride (NS) flush 10 mL (10 mL IntraVENous Given 4/18/19 1748) iopamidol (ISOVUE-370) 76 % injection 100 mL (100 mL IntraVENous Given 4/18/19 1748) oxyCODONE-acetaminophen (PERCOCET) 5-325 mg per tablet 1 Tab (1 Tab Oral Given 4/18/19 1904) IMPRESSION: 
1. Intractable back pain PLAN: 
1. Admit to hospitalist 
 
Diagnosis Clinical Impression:  
1. Intractable back pain Attestations: 
 
Morgan Rutherford NP 
7:44 PM

## 2019-04-19 ENCOUNTER — APPOINTMENT (OUTPATIENT)
Dept: NUCLEAR MEDICINE | Age: 84
DRG: 543 | End: 2019-04-19
Attending: NURSE PRACTITIONER
Payer: MEDICARE

## 2019-04-19 ENCOUNTER — APPOINTMENT (OUTPATIENT)
Dept: MRI IMAGING | Age: 84
DRG: 543 | End: 2019-04-19
Attending: INTERNAL MEDICINE
Payer: MEDICARE

## 2019-04-19 ENCOUNTER — HOSPITAL ENCOUNTER (OUTPATIENT)
Dept: NUCLEAR MEDICINE | Age: 84
Discharge: HOME | End: 2019-04-19
Attending: PHYSICAL MEDICINE & REHABILITATION

## 2019-04-19 LAB
ANION GAP SERPL CALC-SCNC: 7 MMOL/L (ref 5–15)
BUN SERPL-MCNC: 12 MG/DL (ref 6–20)
BUN/CREAT SERPL: 19 (ref 12–20)
CALCIUM SERPL-MCNC: 8.5 MG/DL (ref 8.5–10.1)
CHLORIDE SERPL-SCNC: 98 MMOL/L (ref 97–108)
CO2 SERPL-SCNC: 25 MMOL/L (ref 21–32)
CREAT SERPL-MCNC: 0.62 MG/DL (ref 0.55–1.02)
ERYTHROCYTE [DISTWIDTH] IN BLOOD BY AUTOMATED COUNT: 19.6 % (ref 11.5–14.5)
GLUCOSE SERPL-MCNC: 93 MG/DL (ref 65–100)
HCT VFR BLD AUTO: 35.8 % (ref 35–47)
HGB BLD-MCNC: 11.9 G/DL (ref 11.5–16)
MCH RBC QN AUTO: 31.5 PG (ref 26–34)
MCHC RBC AUTO-ENTMCNC: 33.2 G/DL (ref 30–36.5)
MCV RBC AUTO: 94.7 FL (ref 80–99)
NRBC # BLD: 0.02 K/UL (ref 0–0.01)
NRBC BLD-RTO: 0.3 PER 100 WBC
PLATELET # BLD AUTO: 262 K/UL (ref 150–400)
PMV BLD AUTO: 10.3 FL (ref 8.9–12.9)
POTASSIUM SERPL-SCNC: 3.9 MMOL/L (ref 3.5–5.1)
RBC # BLD AUTO: 3.78 M/UL (ref 3.8–5.2)
SODIUM SERPL-SCNC: 130 MMOL/L (ref 136–145)
WBC # BLD AUTO: 6.4 K/UL (ref 3.6–11)

## 2019-04-19 PROCEDURE — 94760 N-INVAS EAR/PLS OXIMETRY 1: CPT

## 2019-04-19 PROCEDURE — 72146 MRI CHEST SPINE W/O DYE: CPT

## 2019-04-19 PROCEDURE — A9503 TC99M MEDRONATE: HCPCS

## 2019-04-19 PROCEDURE — 77030027138 HC INCENT SPIROMETER -A

## 2019-04-19 PROCEDURE — 74011000250 HC RX REV CODE- 250: Performed by: INTERNAL MEDICINE

## 2019-04-19 PROCEDURE — 65270000029 HC RM PRIVATE

## 2019-04-19 PROCEDURE — 94640 AIRWAY INHALATION TREATMENT: CPT

## 2019-04-19 PROCEDURE — 97162 PT EVAL MOD COMPLEX 30 MIN: CPT

## 2019-04-19 PROCEDURE — 74011250637 HC RX REV CODE- 250/637: Performed by: INTERNAL MEDICINE

## 2019-04-19 PROCEDURE — 74011250636 HC RX REV CODE- 250/636: Performed by: INTERNAL MEDICINE

## 2019-04-19 PROCEDURE — 36415 COLL VENOUS BLD VENIPUNCTURE: CPT

## 2019-04-19 PROCEDURE — 80048 BASIC METABOLIC PNL TOTAL CA: CPT

## 2019-04-19 PROCEDURE — 97116 GAIT TRAINING THERAPY: CPT

## 2019-04-19 PROCEDURE — 85027 COMPLETE CBC AUTOMATED: CPT

## 2019-04-19 PROCEDURE — 77010033678 HC OXYGEN DAILY

## 2019-04-19 RX ORDER — SODIUM CHLORIDE 9 MG/ML
50 INJECTION, SOLUTION INTRAVENOUS CONTINUOUS
Status: DISCONTINUED | OUTPATIENT
Start: 2019-04-20 | End: 2019-04-20 | Stop reason: HOSPADM

## 2019-04-19 RX ORDER — AMLODIPINE BESYLATE 5 MG/1
5 TABLET ORAL DAILY
Status: DISCONTINUED | OUTPATIENT
Start: 2019-04-20 | End: 2019-04-20 | Stop reason: HOSPADM

## 2019-04-19 RX ADMIN — IPRATROPIUM BROMIDE AND ALBUTEROL SULFATE 3 ML: .5; 3 SOLUTION RESPIRATORY (INHALATION) at 20:52

## 2019-04-19 RX ADMIN — ACETAMINOPHEN 650 MG: 325 TABLET ORAL at 22:32

## 2019-04-19 RX ADMIN — LISINOPRIL 40 MG: 20 TABLET ORAL at 12:31

## 2019-04-19 RX ADMIN — DOCUSATE SODIUM 100 MG: 100 CAPSULE, LIQUID FILLED ORAL at 12:30

## 2019-04-19 RX ADMIN — Medication 10 ML: at 02:06

## 2019-04-19 RX ADMIN — MORPHINE SULFATE 1 MG: 2 INJECTION, SOLUTION INTRAMUSCULAR; INTRAVENOUS at 01:41

## 2019-04-19 RX ADMIN — Medication 10 ML: at 05:39

## 2019-04-19 RX ADMIN — ATORVASTATIN CALCIUM 20 MG: 20 TABLET, FILM COATED ORAL at 12:32

## 2019-04-19 RX ADMIN — HYDROCHLOROTHIAZIDE 25 MG: 25 TABLET ORAL at 12:35

## 2019-04-19 RX ADMIN — MORPHINE SULFATE 1 MG: 2 INJECTION, SOLUTION INTRAMUSCULAR; INTRAVENOUS at 06:08

## 2019-04-19 RX ADMIN — MORPHINE SULFATE 1 MG: 2 INJECTION, SOLUTION INTRAMUSCULAR; INTRAVENOUS at 12:37

## 2019-04-19 RX ADMIN — DOCUSATE SODIUM 100 MG: 100 CAPSULE, LIQUID FILLED ORAL at 17:38

## 2019-04-19 RX ADMIN — OMEGA-3 FATTY ACIDS CAP 1000 MG 1 CAPSULE: 1000 CAP at 12:37

## 2019-04-19 RX ADMIN — MORPHINE SULFATE 1 MG: 2 INJECTION, SOLUTION INTRAMUSCULAR; INTRAVENOUS at 08:30

## 2019-04-19 RX ADMIN — ACETAMINOPHEN 650 MG: 325 TABLET ORAL at 12:31

## 2019-04-19 RX ADMIN — MORPHINE SULFATE 1 MG: 2 INJECTION, SOLUTION INTRAMUSCULAR; INTRAVENOUS at 16:35

## 2019-04-19 RX ADMIN — Medication 10 ML: at 14:00

## 2019-04-19 RX ADMIN — HEPARIN SODIUM 5000 UNITS: 5000 INJECTION INTRAVENOUS; SUBCUTANEOUS at 22:32

## 2019-04-19 RX ADMIN — ACETAMINOPHEN 650 MG: 325 TABLET ORAL at 17:38

## 2019-04-19 NOTE — PROGRESS NOTES
Problem: Mobility Impaired (Adult and Pediatric) Goal: *Therapy Goal (Edit Goal, Insert Text) Description Physical Therapy Goals Initiated 4/19/2019 1. Patient will move from supine to sit and sit to supine , scoot up and down and roll side to side in bed with independence within 7 day(s). 2.  Patient will transfer from bed to chair and chair to bed with modified independence using the least restrictive device within 7 day(s). 3.  Patient will perform sit to stand with modified independence within 7 day(s). 4.  Patient will ambulate with modified independence for 150 feet with the least restrictive device within 7 day(s). 5.  Patient will ascend/descend 6 stairs with one sided handrail(s) with modified independence within 7 day(s). Outcome: Not Progressing Towards Goal 
 PHYSICAL THERAPY EVALUATION Patient: Hernan Luis (80 y.o. female) Date: 4/19/2019 Primary Diagnosis: Compression fracture of vertebra (HCC) Kavya Carvalho Precautions:   DNR, Bed Alarm ASSESSMENT : 
Based on the objective data described below, the patient presents with decreased strength, decreased functional mobility, increased back pain, difficulty breathing, and mildly unsteady gait following admission for compression fracture. Currently, patient with increased back pain, impairing her mobility. Patient is slightly below her baseline, requiring CGA for ambulation with RW. Patient with unsteady gait with use of RW, requiring verbal cues for RW management and safety awareness. Patient left sitting in the chair with the bed alarm on at the conclusion of PT evaluation. Noted patient with increased labored breathing, using accessory muscles and elevated HR, O2 sats stable on 2L O2. Once sitting, noted patient coughing with drinking tea. Notified nursing. Recommend  PT with 24/7 supervision at discharge. Patient will benefit from skilled intervention to address the above impairments. Patient?s rehabilitation potential is considered to be Good Factors which may influence rehabilitation potential include:  
? None noted ? Mental ability/status ? Medical condition ? Home/family situation and support systems ? Safety awareness 
? Pain tolerance/management 
? Other: PLAN : 
Recommendations and Planned Interventions: 
?           Bed Mobility Training             ? Neuromuscular Re-Education ? Transfer Training                   ? Orthotic/Prosthetic Training 
? Gait Training                         ? Modalities ? Therapeutic Exercises           ? Edema Management/Control ? Therapeutic Activities            ? Patient and Family Training/Education ? Other (comment): Frequency/Duration: Patient will be followed by physical therapy  4 times a week to address goals. Discharge Recommendations: Home Health with 24/7 supervision Further Equipment Recommendations for Discharge: [facility] SUBJECTIVE:  
Patient stated ? I was carrying a ladder when I fell.? OBJECTIVE DATA SUMMARY:  
HISTORY:   
Past Medical History:  
Diagnosis Date Breast cancer ([facility] [address] 4/4/2013 Carotid artery calcification Constipation COPD (chronic obstructive pulmonary disease) ([facility] [address] Dyslipidemia Hypertension Intraductal papilloma of breast 3/21/2013 Psoriasis Thyroid adenoma Thyroid disease Past Surgical History:  
Procedure Laterality Date HX OOPHORECTOMY    
 RIGHT  
 HX THYROIDECTOMY PARTIAL Prior Level of Function/Home Situation: patient lives with her son and daughter in law. She is independent with ADLs and ambulates mod I with SPC. She reports one fall. Fairly active but does not drive. Personal factors and/or comorbidities impacting plan of care: T9 compression fracture, pain 
 
Home Situation Home Environment: Private residence # Steps to Enter: 6 Rails to Enter: Yes Hand Rails : Left One/Two Story Residence: One story Living Alone: No 
Support Systems: Child(viviana), Family member(s) Patient Expects to be Discharged to[de-identified] Unknown Current DME Used/Available at Home: Priti Sers, straight, Walker, rollator, Galindo-Tang Tub or Shower Type: Tub/Shower combination EXAMINATION/PRESENTATION/DECISION MAKING:  
Critical Behavior: 
  
  
  
  
Hearing: Auditory Auditory Impairment: None Skin:  intact Edema:  
Range Of Motion: 
AROM: Generally decreased, functional 
  
  
  
PROM: Within functional limits Strength:   
Strength: Generally decreased, functional 
  
  
  
  
  
  
Tone & Sensation:  
  
  
  
  
  
  
  
  
  
   
Coordination: 
Coordination: Within functional limits Vision:  
  
Functional Mobility: 
Bed Mobility: 
  
Supine to Sit: Supervision Scooting: Supervision Transfers: 
Sit to Stand: Stand-by assistance Stand to Sit: Stand-by assistance Bed to Chair: Contact guard assistance Balance:  
Sitting: Intact; Without support Standing: Impaired; With support Standing - Static: Good Standing - Dynamic : Fair Ambulation/Gait Training: 
Distance (ft): 60 Feet (ft) Assistive Device: Gait belt;Walker, rolling Ambulation - Level of Assistance: Contact guard assistance Gait Description (WDL): Exceptions to [facility] Gait Abnormalities: Decreased step clearance; Path deviations; Shuffling gait Base of Support: Narrowed Speed/Denisha: Pace decreased (<100 feet/min) Step Length: Right shortened;Left shortened Stairs: Therapeutic Exercises:  
 
 
Functional Measure: 
Barthel Index: 
Bathin Bladder: 10 Bowels: 10 
Groomin Dressing: 10 Feeding: 10 Mobility: 10 Stairs: 5 Toilet Use: 5 Transfer (Bed to Chair and Back): 10 Total: 75/100 Percentage of impairment  
0% 1-19% 20-39% 40-59% 60-79% 80-99% 100% Barthel Score 0-100 100 99-80 79-60 59-40 20-39 1-19 
 0 The Barthel ADL Index: Guidelines 1. The index should be used as a record of what a patient does, not as a record of what a patient could do. 2. The main aim is to establish degree of independence from any help, physical or verbal, however minor and for whatever reason. 3. The need for supervision renders the patient not independent. 4. A patient's performance should be established using the best available evidence. Asking the patient, friends/relatives and nurses are the usual sources, but direct observation and common sense are also important. However direct testing is not needed. 5. Usually the patient's performance over the preceding 24-48 hours is important, but occasionally longer periods will be relevant. 6. Middle categories imply that the patient supplies over 50 per cent of the effort. 7. Use of aids to be independent is allowed. Bhaman Bacon., Barthel, D.W. (5590). Functional evaluation: the Barthel Index. [address] [facility] (14)2. Lynette Larios katy LON Patel, Estill Door., Priya Tee., [facility], [address] [facility] (1999). Measuring the change indisability after inpatient rehabilitation; comparison of the responsiveness of the Barthel Index and Functional [place] Measure. Journal of Neurology, Neurosurgery, and Psychiatry, 66(4), 987-708. Julien Saltre NJUAN MIGUEL.FIDE, PATRICIA Negrete, & Jelena Gibbs MHERBERT. (2004.) Assessment of post-stroke quality of life in cost-effectiveness studies: The usefulness of the Barthel Index and the EuroQoL-5D. [facility], 13, 375-67 Physical Therapy Evaluation Charge Determination History Examination Presentation Decision-Making MEDIUM  Complexity : 1-2 comorbidities / personal factors will impact the outcome/ POC  MEDIUM Complexity : 3 Standardized tests and measures addressing body structure, function, activity limitation and / or participation in recreation  MEDIUM Complexity : Evolving with changing characteristics  Other outcome measures Barthel  MEDIUM Based on the above components, the patient evaluation is determined to be of the following complexity level: MEDIUM Pain: 
Pain Scale 1: Numeric (0 - 10) Pain Intensity 1: 10 
Pain Location 1: Back Pain Orientation 1: Upper Pain Description 1: Aching Pain Intervention(s) 1: Medication (see MAR) Activity Tolerance:  
Fair, O2 sats stable on 2L O2 although with increased labored breathing. HR elevated to 120 bpm with activity. Please refer to the flowsheet for vital signs taken during this treatment. After treatment:  
?         Patient left in no apparent distress sitting up in chair ? Patient left in no apparent distress in bed 
? Call bell left within reach ? Nursing notified ? Caregiver present ? Bed alarm activated COMMUNICATION/EDUCATION:  
The patient?s plan of care was discussed with: Registered Nurse and . ?         Fall prevention education was provided and the patient/caregiver indicated understanding. ? Patient/family have participated as able in goal setting and plan of care. ?         Patient/family agree to work toward stated goals and plan of care. ?         Patient understands intent and goals of therapy, but is neutral about his/her participation. ? Patient is unable to participate in goal setting and plan of care. Thank you for this referral. 
Yun Gill, PT, DPT Time Calculation: 27 mins

## 2019-04-19 NOTE — PROGRESS NOTES
.Reason for Admission:  Compression fracture of vertebra RRAT Score:  14 Do you (patient/family) have any concerns for transition/discharge? No concerns at this time Plan for utilizing home health:  No recommendations at this time Current Advanced Directive/Advance Care Plan: not at this time Likelihood of readmission?  moderate Transition of Care Plan:       
 
CM made room visit with patient who was asleep with daughter-in-law (emergency contact) at bedside. Pt's daughter-in-law confirmed demographics, emergency contact, insurance, and PCP (last seen 2 wk ago). Patient uses [facility] in [place]. Patient, son, and daughter-in-law live in pt's single level home with 4 entry steps with hadCutefundp railing. Patient normally always has someone at home with her. Pt's daughter-in-law stated she does not believe that pt could ever live alone again. Patient has DME including a cane. Prior to pt hurting her back she was independent with ALDs (was able to cook/clean, get in the bathtub, and get dressed without pain) and IADLs (was able to ambulate without cane). After patient hurt her back she was still independent however had problems with ADLs (painful getting dressed, only taking spounge baths, daughter-in-law cooks and does house work, patient unable to recently), IADLs (difficulty due to weakness/pain, using cane at all times). Patient does not drive and is dependent on son or daughter-in-law for all transportation needs. Patient has no history of HH, SNF, or IPR. CM mentioned that the main goal would be for patient to d/c home with [facility]. Patient's daughter-in-law agreeable to this plan and stated that she and her  would be able to care for patient at home. Care Management Interventions PCP Verified by CM: Yes(last seen 2 wk ago) Mode of Transport at Discharge: Other (see comment) Transition of Care Consult (CM Consult): Discharge Planning Physical Therapy Consult: Yes Occupational Therapy Consult: Yes Speech Therapy Consult: No 
Current Support Network: Own Home, Family Lives Nearby, Other(Patiient, son, and daughter-in-law live in pt's single level home with 4 entry steps with hadicap railing ) Confirm Follow Up Transport: Family Plan discussed with Pt/Family/Caregiver: Yes Meir Steve, [address], [address]

## 2019-04-19 NOTE — PROGRESS NOTES
Occupational Therapy Orders received and medical record reviewed. Pt is off the floor for MRI at this time. Will defer initiating OT Evaluation and continue to follow.

## 2019-04-19 NOTE — ROUTINE PROCESS
Bedside and Verbal shift change report given to [id number] Anselmo Wesley RN (oncoming nurse) by Darya Robison RN (offgoing nurse). Report included the following information SBAR, Kardex, Intake/Output, MAR and Recent Results.

## 2019-04-19 NOTE — PROGRESS NOTES
-Please complete MRI History and Safety Screening Form for this patient using KARDEX only under Orders Requiring a Screening Form: 
 

## 2019-04-19 NOTE — PROGRESS NOTES
Bedside, Verbal and Written shift change report given to Patti Tesfaye RN  (oncoming nurse) by Raquel Ortega RN  (offgoing nurse). Report included the following information SBAR, Kardex, Intake/Output and Recent Results.

## 2019-04-19 NOTE — PROGRESS NOTES
TRANSFER - IN REPORT: 
 
Verbal report received from Farnaz Boogie By Ebony RN(name) on [address]  being received from [employer] RN(unit) for routine progression of care Report consisted of patients Situation, Background, Assessment and  
Recommendations(SBAR). Information from the following report(s) SBAR, Kardex, ED Summary, Procedure Summary, Intake/Output, MAR, Recent Results and Cardiac Rhythm NSR /ST was reviewed with the receiving nurse. Opportunity for questions and clarification was provided. Assessment completed upon patients arrival to unit and care assumed.

## 2019-04-19 NOTE — ED NOTES
TRANSFER - OUT REPORT: 
 
Verbal report given to Lilian Dias (name) on Del Mullins  being transferred to Ortho(unit) for routine progression of care Report consisted of patients Situation, Background, Assessment and  
Recommendations(SBAR). Information from the following report(s) SBAR was reviewed with the receiving nurse. Lines:  
Peripheral IV 04/18/19 Left Wrist (Active) Site Assessment Clean, dry, & intact 4/18/2019  3:40 PM  
Phlebitis Assessment 0 4/18/2019  3:40 PM  
Infiltration Assessment 0 4/18/2019  3:40 PM  
Dressing Status Clean, dry, & intact 4/18/2019  3:40 PM  
Dressing Type Transparent 4/18/2019  3:40 PM  
Hub Color/Line Status Pink 4/18/2019  3:40 PM  
  
 
Opportunity for questions and clarification was provided. Patient transported with: 
 Wine [employer]

## 2019-04-19 NOTE — ED NOTES
Patient HR noted to have been readin gin 130s on pulse ox. Patient placed on monitor and NP notified. Patient HR returned to normal range quickly without intervention.

## 2019-04-19 NOTE — PROGRESS NOTES
Hospitalist Progress Note NAME: Fernanda Adorno :  1927 MRN:  [id number] Assessment / Plan: 
Subacute T9 compression fracture with intractable pain: 
- CT T spine with collapse of T9 with slight retropulsion. While new in the interval and remains age indeterminate. Gas within the superior aspect of the endplate favors against an acute injury. -MRI T spine showed compression fracture T9,chronic with acute component 
-Not a candidate for kyphoplasty  
-Bone scan did not show evidence of malignancy(pt has h/o breast cancer) - scheduled tylenol with prn percocet, morphine ordered - Ortho following. 
- PT recommends Home Health with  supervision 
- Continue to treated pain until improved before d/c Constipation due to pain medications: does not remember her last BM because has been some time. Suspect that her L sided abdominal pain is due to constipation rather than fx. 
- sorbitol x1 now 
- colace and miralax scheduled Essential HTN: 
- con't lisinopril, HCTZ 
- prn nitrobid ordered COPD without acute exacerbation: course lungs, denies SOB and says her breathing is at baseline 
- duonebs scheduled BID as she does at home 
- prn albuterol - Pulmonary consulted. No evidence of copd exacerbation Hypothyroidism: con't sythroid Hyperlipidemia: con't lipitor Body mass index is 25.78 kg/m². Code Status: DNR Surrogate Decision Maker: son DVT Prophylaxis: heparin to start after procedure 
  
Baseline: ambulates with cane. Son and dtr-in-law live with her. Disposition:1- 2 days Subjective: Chief Complaint / Reason for Physician Visit Still c/o back pain. Discussed with RN events overnight. Review of Systems: 
Symptom Y/N Comments  Symptom Y/N Comments Fever/Chills n   Chest Pain n   
Poor Appetite n   Edema Cough y   Abdominal Pain Sputum y   Joint Pain y Back pain SOB/HAQUE  At her naseline  Pruritis/Rash Nausea/vomit    Tolerating PT/OT Diarrhea    Tolerating Diet y Constipation    Other Could NOT obtain due to:   
 
Objective: VITALS:  
Last 24hrs VS reviewed since prior progress note. Most recent are: 
Patient Vitals for the past 24 hrs: 
 Temp Pulse Resp BP SpO2  
04/19/19 1546 97.8 °F (36.6 °C) (!) 114 20 167/75 98 % 04/19/19 1239 98.2 °F (36.8 °C) (!) 108 24 157/82 96 % 04/19/19 0825 98.5 °F (36.9 °C) 99 22 147/90   
04/19/19 0412 98 °F (36.7 °C) 62 16 161/66 99 % 04/18/19 2315 97.9 °F (36.6 °C) (!) 108 16 (!) 187/95 97 % 04/18/19 2230 97.8 °F (36.6 °C) (!) 102 16 (!) 165/92 96 % 04/18/19 2200  93 17 144/68 94 % 04/18/19 2130  90 17 138/64 96 % 04/18/19 2100  93 22 151/71 96 % 04/18/19 2000  (!) 110 18 150/61 92 % 04/18/19 1930  (!) 115 21 157/72 95 % 04/18/19 1900  (!) 101 23 149/79 94 % 04/18/19 1815    165/75 96 % No intake or output data in the 24 hours ending 04/19/19 1701 PHYSICAL EXAM: 
General: WD, WN. Alert, cooperative, no acute distress   
EENT:  EOMI. Anicteric sclerae. MMM Resp:  CTA bilaterally, no wheezing or rales. No accessory muscle use CV:  Regular  rhythm,  No edema GI:  Soft, Non distended, Non tender.  +Bowel sounds Back:               Tender at T9 Neurologic:  Alert and oriented X 3, normal speech, Psych:   Good insight. Not anxious nor agitated Skin:  No rashes. No jaundice Reviewed most current lab test results and cultures  YES Reviewed most current radiology test results   YES Review and summation of old records today    NO Reviewed patient's current orders and MAR    YES 
PMH/SH reviewed - no change compared to H&P 
________________________________________________________________________ Care Plan discussed with: 
  Comments Patient x Family  x daughter RN x Care Manager Consultant Multidiciplinary team rounds were held today with , nursing, pharmacist and clinical coordinator.   Patient's plan of care was discussed; medications were reviewed and discharge planning was addressed. ________________________________________________________________________ Total NON critical care TIME: 35   Minutes Total CRITICAL CARE TIME Spent:   Minutes non procedure based Comments >50% of visit spent in counseling and coordination of care    
________________________________________________________________________ Bard Alix MD  
 
Procedures: see electronic medical records for all procedures/Xrays and details which were not copied into this note but were reviewed prior to creation of Plan. LABS: 
I reviewed today's most current labs and imaging studies. Pertinent labs include: 
Recent Labs 04/19/19 0457 04/18/19 
1658 WBC 6.4 9.0 HGB 11.9 11.3* HCT 35.8 33.5*  
 245 Recent Labs 04/19/19 0457 04/18/19 
1658 * 128* K 3.9 3.8 CL 98 94* CO2 25 26 GLU 93 96 BUN 12 13 CREA 0.62 0.78  
CA 8.5 8.6 ALB  --  3.8 TBILI  --  0.8 SGOT  --  24 ALT  --  31 Signed: Bard Alix MD

## 2019-04-19 NOTE — PROGRESS NOTES
Orders received, chart reviewed. Pt currently off the floor to MRI. Will hold PT evaluation however continue to follow.  
 
Fer Chaves, PT, DPT

## 2019-04-19 NOTE — H&P
Hospitalist Admission NoteNAME: Del Mullins :  1927 MRN:  [id number] Date/Time:  2019 8:33 PM 
 
Patient PCP: Hira Gilbert, DO 
______________________________________________________________________ Given the patient's current clinical presentation, I have a high level of concern for decompensation if discharged from the emergency department. Complex decision making was performed, which includes reviewing the patient's available past medical records, laboratory results, and x-ray films. My assessment of this patient's clinical condition and my plan of care is as follows. Assessment / Plan: 
Subacute T9 compression fracture with intractable pain: 
- CT T spine with collapse of T9 with slight retropulsion. While new in the interval and remains age indeterminate. Gas within the superior aspect of the endplate favors against an acute injury. - will get MRI T spine in AM 
- kyphoplasty if felt to be appropriate based on MRI results - scheduled tylenol with prn percocet, morphine ordered Constipation due to pain medications: does not remember her last BM because has been some time. Suspect that her L sided abdominal pain is due to constipation rather than fx. 
- sorbitol x1 now 
- colace and miralax scheduled Essential HTN: 
- con't lisinopril, HCTZ 
- prn nitrobid ordered COPD without acute exacerbation: course lungs, denies SOB and says her breathing is at baseline 
- duonebs scheduled BID as she does at home 
- prn albuterol Hypothyroidism: con't sythroid Hyperlipidemia: con't lipitor Code Status: DNR Surrogate Decision Maker: son DVT Prophylaxis: heparin to start after procedure Baseline: ambulates with cane. Son and dtr-in-law live with her. Subjective: CHIEF COMPLAINT: intractable back pain HISTORY OF PRESENT ILLNESS:    
Del Mullins is a 80 y.o.  female who presents with above.   Pt says that she was moving a ladder while hanging curtains about 3 weeks ago. She thinks she twisted when moving the ladder and developed severe back pain. She was found to have T9 compression fracture and managed conservatively with tramadol. However, over the last few weeks, she has continued to have severe pain. She has had worsening difficulty with ambulation. She has had loss of appetite. She denies fever or URI sx. No CP or SOB. She has thoracic back pain and L sided abdominal pain. She has not had a BM in quite some time. No new edema. We were asked to admit for work up and evaluation of the above problems. Past Medical History:  
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  
Procedure Laterality Date  HX OOPHORECTOMY    
 RIGHT  HX THYROIDECTOMY PARTIAL Social History Tobacco Use  Smoking status: Current Every Day Smoker Packs/day: 0.75 Years: 45.00 Pack years: 33.75  Smokeless tobacco: Never Used Substance Use Topics  Alcohol use: Yes Alcohol/week: 3.5 oz Types: 7 Cans of beer per week Family History Problem Relation Age of Onset  Stroke Mother CEREBRAL HEMMORRHAGE  Cancer Father LUNG  
 Cancer Daughter LUNG/SPINE No Known Allergies Prior to Admission medications Medication Sig Start Date End Date Taking? Authorizing Provider  
traMADol (ULTRAM) 50 mg tablet Take 100 mg by mouth every six (6) hours as needed for Pain. Other, MD Artis  
magnesium citrate solution Drink one bottle. If you have not had large stool output, drink the second bottle.  4/12/19   Jia Tavarez MD  
 Nebulizer & Compressor machine 1 Each by Does Not Apply route every four (4) hours as needed. As directed 4/3/17   Skylar Tavarez MD  
albuterol (ACCUNEB) 1.25 mg/3 mL nebu Take 3 mL by inhalation every four (4) hours as needed (wheezing). 4/3/17   Skylar Tavarez MD  
PSYLLIUM SEED, WITH SUGAR, (METAMUCIL PO) Take  by mouth as needed. Provider, Historical  
atorvastatin (LIPITOR) 20 mg tablet Take  by mouth daily. Provider, Historical  
levothyroxine (SYNTHROID) 100 mcg tablet Take  by mouth Daily (before breakfast). Provider, Historical  
lisinopril (PRINIVIL, ZESTRIL) 40 mg tablet Take 40 mg by mouth daily. Provider, Historical  
albuterol (PROVENTIL HFA, VENTOLIN HFA) 90 mcg/actuation inhaler Take 1 Puff by inhalation. Provider, Historical  
hydrochlorothiazide (HYDRODIURIL) 25 mg tablet Take 25 mg by mouth daily. Provider, Historical  
calcium-cholecalciferol, d3, (CALCIUM 600 + D) 600-125 mg-unit Tab Take  by mouth. Provider, Historical  
omega-3 fatty acids-vitamin e (FISH OIL) 1,000 mg Cap Take 1 Cap by mouth. Provider, Historical  
 
 
REVIEW OF SYSTEMS:    
I am not able to complete the review of systems because: The patient is intubated and sedated The patient has altered mental status due to his acute medical problems The patient has baseline aphasia from prior stroke(s) The patient has baseline dementia and is not reliable historian The patient is in acute medical distress and unable to provide information Total of 12 systems reviewed as follows:   
   POSITIVE= underlined text  Negative = text not underlined General:  fever, chills, sweats, generalized weakness, weight loss/gain,  
   loss of appetite Eyes:    blurred vision, eye pain, loss of vision, double vision ENT:    rhinorrhea, pharyngitis Respiratory:   cough, sputum production, SOB, HAQUE, wheezing, pleuritic pain Cardiology:   chest pain, palpitations, orthopnea, PND, edema, syncope Gastrointestinal:  abdominal pain , N/V, diarrhea, dysphagia, constipation, bleeding Genitourinary:  frequency, urgency, dysuria, hematuria, incontinence Muskuloskeletal :  arthralgia, myalgia, back pain Hematology:  easy bruising, nose or gum bleeding, lymphadenopathy Dermatological: rash, ulceration, pruritis, color change / jaundice Endocrine:   hot flashes or polydipsia Neurological:  headache, dizziness, confusion, focal weakness, paresthesia, Speech difficulties, memory loss, gait difficulty Psychological: Feelings of anxiety, depression, agitation Objective: VITALS:   
Visit Vitals /74 (BP 1 Location: Right arm, BP Patient Position: At rest) Pulse 83 Temp 98 °F (36.7 °C) Resp 18 Ht 5' (1.524 m) Wt 59.9 kg (132 lb) SpO2 94% BMI 25.78 kg/m² PHYSICAL EXAM: 
 
General:    Alert, cooperative, no distress, appears stated age. HEENT: Atraumatic, anicteric sclerae, pink conjunctivae No oral ulcers, mucosa moist, throat clear, dentition fair Neck:  Supple, symmetrical,  thyroid: non tender Lungs:   Clourse breath sounds bilaterally. No Wheezing or Rhonchi. No rales. Chest wall:  No tenderness  No Accessory muscle use. Heart:   Regular  rhythm,  No  murmur   No edema Abdomen:   Soft, diffuse mild-mod tenderness. Moderately distended. Bowel sounds normal 
Extremities: No cyanosis. No clubbing,   
  Skin turgor normal, Capillary refill normal, Radial dial pulse 2+ Skin:     Pale. Not Jaundiced  No rashes Psych:  Fair insight. Not depressed. Not anxious or agitated. Neurologic: EOMs intact. No facial asymmetry. No aphasia or slurred speech. Symmetrical strength, Sensation grossly intact. Alert and oriented X 4.  
 
_______________________________________________________________________ Care Plan discussed with: 
  Comments Patient x Family  x dtr in law at bedside RN    
 Care Manager Consultant:     
_______________________________________________________________________ Expected  Disposition:  
Home with Family x HH/PT/OT/RN x  
SNF/LTC   
DIAMOND   
________________________________________________________________________ TOTAL TIME:  45 Minutes Critical Care Provided     Minutes non procedure based Comments  
 x Reviewed previous records  
>50% of visit spent in counseling and coordination of care x Discussion with patient and/or family and questions answered 
  
 
________________________________________________________________________ Signed: Jessica Perez MD 
 
Procedures: see electronic medical records for all procedures/Xrays and details which were not copied into this note but were reviewed prior to creation of Plan. LAB DATA REVIEWED:   
Recent Results (from the past 24 hour(s)) METABOLIC PANEL, COMPREHENSIVE Collection Time: 04/18/19  4:58 PM  
Result Value Ref Range Sodium 128 (L) 136 - 145 mmol/L Potassium 3.8 3.5 - 5.1 mmol/L Chloride 94 (L) 97 - 108 mmol/L  
 CO2 26 21 - 32 mmol/L Anion gap 8 5 - 15 mmol/L Glucose 96 65 - 100 mg/dL BUN 13 6 - 20 MG/DL Creatinine 0.78 0.55 - 1.02 MG/DL  
 BUN/Creatinine ratio 17 12 - 20 GFR est AA >60 >60 ml/min/1.73m2 GFR est non-AA >60 >60 ml/min/1.73m2 Calcium 8.6 8.5 - 10.1 MG/DL Bilirubin, total 0.8 0.2 - 1.0 MG/DL  
 ALT (SGPT) 31 12 - 78 U/L  
 AST (SGOT) 24 15 - 37 U/L Alk. phosphatase 97 45 - 117 U/L Protein, total 6.6 6.4 - 8.2 g/dL Albumin 3.8 3.5 - 5.0 g/dL Globulin 2.8 2.0 - 4.0 g/dL A-G Ratio 1.4 1.1 - 2.2    
CBC WITH AUTOMATED DIFF Collection Time: 04/18/19  4:58 PM  
Result Value Ref Range WBC 9.0 3.6 - 11.0 K/uL  
 RBC 3.59 (L) 3.80 - 5.20 M/uL  
 HGB 11.3 (L) 11.5 - 16.0 g/dL HCT 33.5 (L) 35.0 - 47.0 % MCV 93.3 80.0 - 99.0 FL  
 MCH 31.5 26.0 - 34.0 PG  
 MCHC 33.7 30.0 - 36.5 g/dL  
 RDW 19.5 (H) 11.5 - 14.5 % PLATELET 391 623 - 807 K/uL MPV 9.9 8.9 - 12.9 FL  
 NRBC 0.3 (H) 0  WBC ABSOLUTE NRBC 0.03 (H) 0.00 - 0.01 K/uL NEUTROPHILS 59 32 - 75 % LYMPHOCYTES 25 12 - 49 % MONOCYTES 15 (H) 5 - 13 % EOSINOPHILS 0 0 - 7 % BASOPHILS 0 0 - 1 % IMMATURE GRANULOCYTES 1 (H) 0.0 - 0.5 % ABS. NEUTROPHILS 5.4 1.8 - 8.0 K/UL  
 ABS. LYMPHOCYTES 2.2 0.8 - 3.5 K/UL  
 ABS. MONOCYTES 1.3 (H) 0.0 - 1.0 K/UL  
 ABS. EOSINOPHILS 0.0 0.0 - 0.4 K/UL  
 ABS. BASOPHILS 0.0 0.0 - 0.1 K/UL  
 ABS. IMM. GRANS. 0.1 (H) 0.00 - 0.04 K/UL  
 DF AUTOMATED URINALYSIS W/MICROSCOPIC Collection Time: 04/18/19  4:58 PM  
Result Value Ref Range Color YELLOW/STRAW Appearance CLEAR CLEAR Specific gravity 1.013 1.003 - 1.030    
 pH (UA) 6.0 5.0 - 8.0 Protein NEGATIVE  NEG mg/dL Glucose NEGATIVE  NEG mg/dL Ketone NEGATIVE  NEG mg/dL Bilirubin NEGATIVE  NEG Blood NEGATIVE  NEG Urobilinogen 1.0 0.2 - 1.0 EU/dL Nitrites NEGATIVE  NEG Leukocyte Esterase SMALL (A) NEG    
 WBC 10-20 0 - 4 /hpf  
 RBC 0-5 0 - 5 /hpf Epithelial cells FEW FEW /lpf Bacteria NEGATIVE  NEG /hpf Hyaline cast 2-5 0 - 5 /lpf  
LIPASE Collection Time: 04/18/19  4:58 PM  
Result Value Ref Range  Lipase 70 (L) 73 - 393 U/L

## 2019-04-19 NOTE — PROGRESS NOTES
Went to floor to dose patient for bone scan. Pt currently in MRI and possible kyphoplasty to follow. Bone scan on hold. If needed today, please contact [facility] at [address] .

## 2019-04-19 NOTE — CONSULTS
[facility]  Pulmonary, Critical Care, and Sleep Medicine    Initial Patient Consult    Name: Rogelio Baker MRN: [id number]   : 1927 Hospital: [facility]    Date: 2019        IMPRESSION:   · COPD  · Back pain  · T Spine compression fracture      RECOMMENDATIONS:   · No evidence of any copd exacerbation. NO WHEEZING  · Not see by pulmonary as out pt since   · Never seen by pulmonary as in pt  · Out pt pft's in  revealed no obstruction  · SOB due to back pain  · Nothing more to add  · Will sign off     Subjective: This patient has been seen and evaluated at the request of hospitalist for dyspnea. Patient is a 80 y.o. female smoker, not seen by pulmonary as out pt regularly since   Admitted for back pain due to T-spine compression fracture  We were called to see for COPD??  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 Authorizing Provider   traMADol (ULTRAM) 50 mg tablet Take 100 mg by mouth every six (6) hours as needed for Pain. Other, MD Artis   magnesium citrate solution Drink one bottle. If you have not had large stool output, drink the second bottle. 19   Samy Tavarez MD   Nebulizer & Compressor machine 1 Each by Does Not Apply route every four (4) hours as needed.  As directed 4/3/17   Samy Tavarez MD   albuterol (ACCUNEB) 1.25 mg/3 mL nebu Take 3 mL by inhalation every four (4) hours as needed (wheezing). 4/3/17   Iveth Tavarez MD   PSYLLIUM SEED, WITH SUGAR, (METAMUCIL PO) Take  by mouth as needed. Provider, Historical   atorvastatin (LIPITOR) 20 mg tablet Take  by mouth daily. Provider, Historical   levothyroxine (SYNTHROID) 100 mcg tablet Take  by mouth Daily (before breakfast). Provider, Historical   lisinopril (PRINIVIL, ZESTRIL) 40 mg tablet Take 40 mg by mouth daily. Provider, Historical   albuterol (PROVENTIL HFA, VENTOLIN HFA) 90 mcg/actuation inhaler Take 1 Puff by inhalation. Provider, Historical   hydrochlorothiazide (HYDRODIURIL) 25 mg tablet Take 25 mg by mouth daily. Provider, Historical   calcium-cholecalciferol, d3, (CALCIUM 600 + D) 600-125 mg-unit Tab Take  by mouth. Provider, Historical   omega-3 fatty acids-vitamin e (FISH OIL) 1,000 mg Cap Take 1 Cap by mouth. Provider, Historical     No Known Allergies   Social History     Tobacco Use    Smoking status: Current Every Day Smoker     Packs/day: 0.75     Years: 45.00     Pack years: 33.75    Smokeless tobacco: Never Used   Substance Use Topics    Alcohol use:  Yes     Alcohol/week: 3.5 oz     Types: 7 Cans of beer per week      Family History   Problem Relation Age of Onset    Stroke Mother         CEREBRAL HEMMORRHAGE    Cancer Father         LUNG    Cancer Daughter         LUNG/SPINE        Current Facility-Administered Medications   Medication Dose Route Frequency    sodium chloride (NS) flush 5-40 mL  5-40 mL IntraVENous Q8H    acetaminophen (TYLENOL) tablet 650 mg  650 mg Oral QID    docusate sodium (COLACE) capsule 100 mg  100 mg Oral BID    heparin (porcine) injection 5,000 Units  5,000 Units SubCUTAneous Q12H    polyethylene glycol (MIRALAX) packet 17 g  17 g Oral DAILY    albuterol-ipratropium (DUO-NEB) 2.5 MG-0.5 MG/3 ML  3 mL Nebulization Q12H    atorvastatin (LIPITOR) tablet 20 mg  20 mg Oral DAILY    calcium-vitamin D (OS-KAROL) 500 mg-200 unit tablet  1 Tab Oral DAILY WITH BREAKFAST    hydroCHLOROthiazide (HYDRODIURIL) tablet 25 mg  25 mg Oral DAILY    levothyroxine (SYNTHROID) tablet 100 mcg  100 mcg Oral 6am    lisinopril (PRINIVIL, ZESTRIL) tablet 40 mg  40 mg Oral DAILY    omega 3-DHA-EPA-fish oil 1,000 mg (120 mg-180 mg) capsule 1 Cap  1 Cap Oral DAILY       Review of Systems:  A comprehensive review of systems was negative except for: Respiratory: positive for dyspnea on exertion  Musculoskeletal: positive for back pain    Objective:   Vital Signs:    Visit Vitals  /75 (BP Patient Position: At rest)   Pulse (!) 114   Temp 97.8 °F (36.6 °C)   Resp 20   Ht 5' (1.524 m)   Wt 59.9 kg (132 lb)   SpO2 98%   BMI 25.78 kg/m²       O2 Device: Nasal cannula   O2 Flow Rate (L/min): 2 l/min   Temp (24hrs), Av °F (36.7 °C), Min:97.8 °F (36.6 °C), Max:98.5 °F (36.9 °C)       Intake/Output:   Last shift:      No intake/output data recorded. Last 3 shifts: No intake/output data recorded. No intake or output data in the 24 hours ending 19 1558   Physical Exam:   General:  Alert, cooperative, no distress, appears stated age. Head:  Normocephalic, without obvious abnormality, atraumatic. Eyes:  Conjunctivae/corneas clear. PERRL, EOMs intact. Nose: Nares normal. Septum midline. Mucosa normal. No drainage or sinus tenderness. Throat: Lips, mucosa, and tongue normal. Teeth and gums normal.   Neck: Supple, symmetrical, trachea midline, no adenopathy, thyroid: no enlargment/tenderness/nodules, no carotid bruit and no JVD. Back:   Symmetric, no curvature. ROM normal.   Lungs:   Clear to auscultation bilaterally. Chest wall:  No tenderness or deformity. Heart:  Regular rate and rhythm, S1, S2 normal, no murmur, click, rub or gallop. Abdomen:   Soft, non-tender. Bowel sounds normal. No masses,  No organomegaly. Extremities: Extremities normal, atraumatic, no cyanosis or edema. Pulses: 2+ and symmetric all extremities.    Skin: Skin color, texture, turgor normal. No rashes or lesions   Lymph nodes: Cervical, supraclavicular, and axillary nodes normal.   Neurologic: Grossly nonfocal     Data review:     Recent Results (from the past 24 hour(s))   METABOLIC PANEL, COMPREHENSIVE    Collection Time: 04/18/19  4:58 PM   Result Value Ref Range    Sodium 128 (L) 136 - 145 mmol/L    Potassium 3.8 3.5 - 5.1 mmol/L    Chloride 94 (L) 97 - 108 mmol/L    CO2 26 21 - 32 mmol/L    Anion gap 8 5 - 15 mmol/L    Glucose 96 65 - 100 mg/dL    BUN 13 6 - 20 MG/DL    Creatinine 0.78 0.55 - 1.02 MG/DL    BUN/Creatinine ratio 17 12 - 20      GFR est AA >60 >60 ml/min/1.73m2    GFR est non-AA >60 >60 ml/min/1.73m2    Calcium 8.6 8.5 - 10.1 MG/DL    Bilirubin, total 0.8 0.2 - 1.0 MG/DL    ALT (SGPT) 31 12 - 78 U/L    AST (SGOT) 24 15 - 37 U/L    Alk. phosphatase 97 45 - 117 U/L    Protein, total 6.6 6.4 - 8.2 g/dL    Albumin 3.8 3.5 - 5.0 g/dL    Globulin 2.8 2.0 - 4.0 g/dL    A-G Ratio 1.4 1.1 - 2.2     CBC WITH AUTOMATED DIFF    Collection Time: 04/18/19  4:58 PM   Result Value Ref Range    WBC 9.0 3.6 - 11.0 K/uL    RBC 3.59 (L) 3.80 - 5.20 M/uL    HGB 11.3 (L) 11.5 - 16.0 g/dL    HCT 33.5 (L) 35.0 - 47.0 %    MCV 93.3 80.0 - 99.0 FL    MCH 31.5 26.0 - 34.0 PG    MCHC 33.7 30.0 - 36.5 g/dL    RDW 19.5 (H) 11.5 - 14.5 %    PLATELET 444 914 - 215 K/uL    MPV 9.9 8.9 - 12.9 FL    NRBC 0.3 (H) 0  WBC    ABSOLUTE NRBC 0.03 (H) 0.00 - 0.01 K/uL    NEUTROPHILS 59 32 - 75 %    LYMPHOCYTES 25 12 - 49 %    MONOCYTES 15 (H) 5 - 13 %    EOSINOPHILS 0 0 - 7 %    BASOPHILS 0 0 - 1 %    IMMATURE GRANULOCYTES 1 (H) 0.0 - 0.5 %    ABS. NEUTROPHILS 5.4 1.8 - 8.0 K/UL    ABS. LYMPHOCYTES 2.2 0.8 - 3.5 K/UL    ABS. MONOCYTES 1.3 (H) 0.0 - 1.0 K/UL    ABS. EOSINOPHILS 0.0 0.0 - 0.4 K/UL    ABS. BASOPHILS 0.0 0.0 - 0.1 K/UL    ABS. IMM.  GRANS. 0.1 (H) 0.00 - 0.04 K/UL    DF AUTOMATED     URINALYSIS W/MICROSCOPIC    Collection Time: 04/18/19  4:58 PM   Result Value Ref Range    Color YELLOW/STRAW      Appearance CLEAR CLEAR      Specific gravity 1.013 1.003 - 1.030      pH (UA) 6.0 5.0 - 8.0      Protein NEGATIVE  NEG mg/dL    Glucose NEGATIVE  NEG mg/dL    Ketone NEGATIVE  NEG mg/dL    Bilirubin NEGATIVE  NEG      Blood NEGATIVE  NEG      Urobilinogen 1.0 0.2 - 1.0 EU/dL    Nitrites NEGATIVE  NEG      Leukocyte Esterase SMALL (A) NEG      WBC 10-20 0 - 4 /hpf    RBC 0-5 0 - 5 /hpf    Epithelial cells FEW FEW /lpf    Bacteria NEGATIVE  NEG /hpf    Hyaline cast 2-5 0 - 5 /lpf   LIPASE    Collection Time: 04/18/19  4:58 PM   Result Value Ref Range    Lipase 70 (L) 73 - 884 U/L   METABOLIC PANEL, BASIC    Collection Time: 04/19/19  4:57 AM   Result Value Ref Range    Sodium 130 (L) 136 - 145 mmol/L    Potassium 3.9 3.5 - 5.1 mmol/L    Chloride 98 97 - 108 mmol/L    CO2 25 21 - 32 mmol/L    Anion gap 7 5 - 15 mmol/L    Glucose 93 65 - 100 mg/dL    BUN 12 6 - 20 MG/DL    Creatinine 0.62 0.55 - 1.02 MG/DL    BUN/Creatinine ratio 19 12 - 20      GFR est AA >60 >60 ml/min/1.73m2    GFR est non-AA >60 >60 ml/min/1.73m2    Calcium 8.5 8.5 - 10.1 MG/DL   CBC W/O DIFF    Collection Time: 04/19/19  4:57 AM   Result Value Ref Range    WBC 6.4 3.6 - 11.0 K/uL    RBC 3.78 (L) 3.80 - 5.20 M/uL    HGB 11.9 11.5 - 16.0 g/dL    HCT 35.8 35.0 - 47.0 %    MCV 94.7 80.0 - 99.0 FL    MCH 31.5 26.0 - 34.0 PG    MCHC 33.2 30.0 - 36.5 g/dL    RDW 19.6 (H) 11.5 - 14.5 %    PLATELET 235 636 - 571 K/uL    MPV 10.3 8.9 - 12.9 FL    NRBC 0.3 (H) 0  WBC    ABSOLUTE NRBC 0.02 (H) 0.00 - 0.01 K/uL       Imaging:  I have personally reviewed the patients radiographs and have reviewed the reports:  CXR: stephanie, COPD        Raphael Guerra MD

## 2019-04-20 VITALS
HEIGHT: 60 IN | TEMPERATURE: 97.6 F | HEART RATE: 94 BPM | SYSTOLIC BLOOD PRESSURE: 154 MMHG | BODY MASS INDEX: 25.91 KG/M2 | OXYGEN SATURATION: 95 % | DIASTOLIC BLOOD PRESSURE: 94 MMHG | WEIGHT: 132 LBS | RESPIRATION RATE: 16 BRPM

## 2019-04-20 LAB
ANION GAP SERPL CALC-SCNC: 7 MMOL/L (ref 5–15)
BUN SERPL-MCNC: 13 MG/DL (ref 6–20)
BUN/CREAT SERPL: 19 (ref 12–20)
CALCIUM SERPL-MCNC: 8.4 MG/DL (ref 8.5–10.1)
CHLORIDE SERPL-SCNC: 96 MMOL/L (ref 97–108)
CO2 SERPL-SCNC: 26 MMOL/L (ref 21–32)
CREAT SERPL-MCNC: 0.67 MG/DL (ref 0.55–1.02)
ERYTHROCYTE [DISTWIDTH] IN BLOOD BY AUTOMATED COUNT: 19.6 % (ref 11.5–14.5)
GLUCOSE SERPL-MCNC: 103 MG/DL (ref 65–100)
HCT VFR BLD AUTO: 32.7 % (ref 35–47)
HGB BLD-MCNC: 10.9 G/DL (ref 11.5–16)
MAGNESIUM SERPL-MCNC: 2 MG/DL (ref 1.6–2.4)
MCH RBC QN AUTO: 31.4 PG (ref 26–34)
MCHC RBC AUTO-ENTMCNC: 33.3 G/DL (ref 30–36.5)
MCV RBC AUTO: 94.2 FL (ref 80–99)
NRBC # BLD: 0.02 K/UL (ref 0–0.01)
NRBC BLD-RTO: 0.3 PER 100 WBC
PHOSPHATE SERPL-MCNC: 3.5 MG/DL (ref 2.6–4.7)
PLATELET # BLD AUTO: 236 K/UL (ref 150–400)
PMV BLD AUTO: 10.4 FL (ref 8.9–12.9)
POTASSIUM SERPL-SCNC: 3.8 MMOL/L (ref 3.5–5.1)
RBC # BLD AUTO: 3.47 M/UL (ref 3.8–5.2)
SODIUM SERPL-SCNC: 129 MMOL/L (ref 136–145)
WBC # BLD AUTO: 6.8 K/UL (ref 3.6–11)

## 2019-04-20 PROCEDURE — 94640 AIRWAY INHALATION TREATMENT: CPT

## 2019-04-20 PROCEDURE — 74011250637 HC RX REV CODE- 250/637: Performed by: INTERNAL MEDICINE

## 2019-04-20 PROCEDURE — 97530 THERAPEUTIC ACTIVITIES: CPT

## 2019-04-20 PROCEDURE — 74011250637 HC RX REV CODE- 250/637: Performed by: HOSPITALIST

## 2019-04-20 PROCEDURE — 74011250636 HC RX REV CODE- 250/636: Performed by: INTERNAL MEDICINE

## 2019-04-20 PROCEDURE — 80048 BASIC METABOLIC PNL TOTAL CA: CPT

## 2019-04-20 PROCEDURE — 74011000250 HC RX REV CODE- 250: Performed by: INTERNAL MEDICINE

## 2019-04-20 PROCEDURE — 36415 COLL VENOUS BLD VENIPUNCTURE: CPT

## 2019-04-20 PROCEDURE — 85027 COMPLETE CBC AUTOMATED: CPT

## 2019-04-20 PROCEDURE — 83735 ASSAY OF MAGNESIUM: CPT

## 2019-04-20 PROCEDURE — 94760 N-INVAS EAR/PLS OXIMETRY 1: CPT

## 2019-04-20 PROCEDURE — 97165 OT EVAL LOW COMPLEX 30 MIN: CPT

## 2019-04-20 PROCEDURE — 84100 ASSAY OF PHOSPHORUS: CPT

## 2019-04-20 PROCEDURE — 77010033678 HC OXYGEN DAILY

## 2019-04-20 PROCEDURE — 74011250636 HC RX REV CODE- 250/636: Performed by: HOSPITALIST

## 2019-04-20 RX ORDER — POLYETHYLENE GLYCOL 3350 17 G/17G
17 POWDER, FOR SOLUTION ORAL DAILY
Qty: 30 PACKET | Refills: 0 | Status: SHIPPED | OUTPATIENT
Start: 2019-04-21

## 2019-04-20 RX ORDER — AMOXICILLIN 250 MG
2 CAPSULE ORAL 2 TIMES DAILY
Qty: 120 TAB | Refills: 0 | Status: SHIPPED | OUTPATIENT
Start: 2019-04-20 | End: 2019-05-20

## 2019-04-20 RX ORDER — FACIAL-BODY WIPES
10 EACH TOPICAL
Qty: 10 SUPPOSITORY | Refills: 0 | Status: SHIPPED | OUTPATIENT
Start: 2019-04-20

## 2019-04-20 RX ORDER — OXYCODONE HYDROCHLORIDE 5 MG/1
5 TABLET ORAL
Qty: 28 TAB | Refills: 0 | Status: SHIPPED | OUTPATIENT
Start: 2019-04-20 | End: 2019-04-27

## 2019-04-20 RX ORDER — ACETAMINOPHEN 325 MG/1
650 TABLET ORAL 4 TIMES DAILY
Qty: 56 TAB | Refills: 0 | Status: SHIPPED | OUTPATIENT
Start: 2019-04-20 | End: 2019-04-27

## 2019-04-20 RX ADMIN — Medication 10 ML: at 12:23

## 2019-04-20 RX ADMIN — OXYCODONE AND ACETAMINOPHEN 1 TABLET: 5; 325 TABLET ORAL at 08:30

## 2019-04-20 RX ADMIN — IPRATROPIUM BROMIDE AND ALBUTEROL SULFATE 3 ML: .5; 3 SOLUTION RESPIRATORY (INHALATION) at 07:31

## 2019-04-20 RX ADMIN — SODIUM CHLORIDE 50 ML/HR: 900 INJECTION, SOLUTION INTRAVENOUS at 01:11

## 2019-04-20 RX ADMIN — HEPARIN SODIUM 5000 UNITS: 5000 INJECTION INTRAVENOUS; SUBCUTANEOUS at 08:20

## 2019-04-20 RX ADMIN — DOCUSATE SODIUM 100 MG: 100 CAPSULE, LIQUID FILLED ORAL at 08:19

## 2019-04-20 RX ADMIN — LEVOTHYROXINE SODIUM 100 MCG: 100 TABLET ORAL at 06:15

## 2019-04-20 RX ADMIN — OMEGA-3 FATTY ACIDS CAP 1000 MG 1 CAPSULE: 1000 CAP at 08:20

## 2019-04-20 RX ADMIN — Medication 10 ML: at 01:11

## 2019-04-20 RX ADMIN — ACETAMINOPHEN 650 MG: 325 TABLET ORAL at 12:23

## 2019-04-20 RX ADMIN — ACETAMINOPHEN 650 MG: 325 TABLET ORAL at 08:19

## 2019-04-20 RX ADMIN — LISINOPRIL 40 MG: 20 TABLET ORAL at 08:19

## 2019-04-20 RX ADMIN — ATORVASTATIN CALCIUM 20 MG: 20 TABLET, FILM COATED ORAL at 08:19

## 2019-04-20 RX ADMIN — POLYETHYLENE GLYCOL 3350 17 G: 17 POWDER, FOR SOLUTION ORAL at 08:20

## 2019-04-20 RX ADMIN — Medication 10 ML: at 06:15

## 2019-04-20 RX ADMIN — OYSTER SHELL CALCIUM WITH VITAMIN D 1 TABLET: 500; 200 TABLET, FILM COATED ORAL at 08:19

## 2019-04-20 RX ADMIN — AMLODIPINE BESYLATE 5 MG: 5 TABLET ORAL at 08:19

## 2019-04-20 RX ADMIN — OXYCODONE AND ACETAMINOPHEN 1 TABLET: 5; 325 TABLET ORAL at 12:23

## 2019-04-20 NOTE — PROGRESS NOTES
Orthopedic NP Progress Note Post Op day: * No surgery found * April 20, 0047 5:72 AM  
 
Wolfgang Guardado Attending Physician: Treatment Team: Attending Provider: Lydia Dunn MD; Consulting Provider: Shayla Santo NP; Consulting Provider: Merrick Martinez MD; Consulting Provider: Liborio Alanis MD; Utilization Review: Demi Pepe RN; Care Manager: Citlaly Caro Primary Nurse: Nerissa Kennedy Vital Signs:   
Patient Vitals for the past 8 hrs: 
 BP Temp Pulse Resp SpO2  
04/20/19 0756 (!) 154/94 97.6 °F (36.4 °C) 94 16 95 % 04/20/19 0732     98 % 04/20/19 0438 152/65 97.4 °F (36.3 °C) 70 24 95 % BMI (calculated): 25.8 (04/18/19 1541) Intake/Output: 
04/20 0701 - 04/20 1900 In: -  
Out: 630 [JRGCK:563] 04/18 1901 - 04/20 0700 In: -  
Out: 100 [Urine:100] Pain Control:  
Pain Assessment Pain Scale 1: Numeric (0 - 10) Pain Intensity 1: 6 Pain Onset 1: acute Pain Location 1: Back Pain Orientation 1: Upper Pain Description 1: Aching Pain Intervention(s) 1: Medication (see MAR) LAB:   
Recent Labs  
  04/20/19 
0510 HCT 32.7* HGB 10.9* Lab Results Component Value Date/Time Sodium 129 (L) 04/20/2019 05:10 AM  
 Potassium 3.8 04/20/2019 05:10 AM  
 Chloride 96 (L) 04/20/2019 05:10 AM  
 CO2 26 04/20/2019 05:10 AM  
 Glucose 103 (H) 04/20/2019 05:10 AM  
 BUN 13 04/20/2019 05:10 AM  
 Creatinine 0.67 04/20/2019 05:10 AM  
 Calcium 8.4 (L) 04/20/2019 05:10 AM  
 
 
Subjective:  Wolfgang Guardado is a 80 y.o. female with continued back pain that is better managed . Tolerating diet. Objective: General: alert, cooperative, no distress. Neuro/Vascular: CNS Intact. Sensation stable. Brisk cap refill, + pulses UE/LE Musculoskeletal:  +ROM UE/LE. Elva's sign negative in bilateral lower extremities. Calves soft, supple, non-tender upon palpation or with passive stretch. Skin: warm and dry [place] - n Drain - n 
  
 
PT/OT:  
Gait:  Gait Base of Support: Narrowed Speed/Denisha: Pace decreased (<100 feet/min) Step Length: Right shortened, Left shortened Gait Abnormalities: Decreased step clearance, Path deviations, Shuffling gait Ambulation - Level of Assistance: Contact guard assistance Distance (ft): 60 Feet (ft) Assistive Device: Gait belt, Walker, rolling Assessment:  
 s/p Active Problems: 
  Compression fracture of vertebra ([address] (4/18/2019) Plan: -  Subacute T 9 compression fx - not a candidate for kyphoplasty, advised up as tolerated with pain managment as per medicine. Pt may follow up with Dr. Annabelle Rebollar in 2 weeks -  Please call with any questions or concerns Dr. Fischer Asper aware and agree with plan. Signed By: Darlene Marquez NP Orthopedic Nurse Practitioner

## 2019-04-20 NOTE — PROGRESS NOTES
Hospitalist Progress Note NAME: Andi Queen :  1927 MRN:  [id number] Assessment / Plan: 
Subacute T9 compression fracture/ Rib fractures with intractable pain 
-pain controlled with percocet + on scheduled tylenol  
--> dc home on scheduled tylenol + oxy prn ( d/w DIL/pt/dtr) , will give 7 days supply then should follow with PCP Continue aggressive bowel regiment  
-seen by ortho, help was greatly appreciated: 
Not a candidate for kyphoplasty Up as tolerated with pain managment Pt may follow up with Dr. Mayco Hudson in 2 weeks  
-MRI was reviewed by IR: not a candidate for kyphoplasty  
-Bone scan: T9 compression fracture with fractures of left posterior ribs. No 
evidence of metastatic disease. 
- CT T spine with collapse of T9 with slight retropulsion. While new in the interval and remains age indeterminate. Gas within the superior aspect of the endplate favors against an acute injury. Hyponatremia  
-chronic, baseline Na ~ 128-132 
-pt is on hctz at home , cont for now --> follow with pcp to decide on further use of hctz ( pcp knows pt better clinically) Constipation due to pain medications 
-supp offered pt prior to dc 
-dc home on aggressive bowel regiment Essential HTN 
-BP stable  
- con't lisinopril, HCTZ 
 
COPD without acute exacerbation 
- stable Pt noted with mild to mod dyspnea at rest/with conversation but she stated \" I am as usual\". Dtr/ DIL confirmed that respiratory status at baseline  
- duonebs scheduled BID as she does at home 
- prn albuterol Hypothyroidism: con't sythroid Hyperlipidemia: con't lipitor 
 
 
  
Code Status: DNR Surrogate Decision Maker: son DVT Prophylaxis: heparin  
  
Baseline: ambulates with cane. Son and dtr-in-law live with her. Recommended Disposition: PT [facility] Subjective: Chief Complaint / Reason for Physician Visit: compression fracture/ ribs fracture / copd Pain: controlled with percocet BM: small last night Discussed with RN events overnight. Review of Systems: 
Symptom Y/N Comments  Symptom Y/N Comments Fever/Chills n   Chest Pain n   
Poor Appetite    Edema Cough    Abdominal Pain n   
Sputum    Joint Pain SOB/HAQUE n   Pruritis/Rash Nausea/vomit    Tolerating PT/OT Diarrhea    Tolerating Diet Constipation    Other Could NOT obtain due to:   
 
Objective: VITALS:  
Last 24hrs VS reviewed since prior progress note. Most recent are: 
Patient Vitals for the past 24 hrs: 
 Temp Pulse Resp BP SpO2  
04/20/19 0756 97.6 °F (36.4 °C) 94 16 (!) 154/94 95 % 04/20/19 0732     98 % 04/20/19 0438 97.4 °F (36.3 °C) 70 24 152/65 95 % 04/19/19 2052     98 % 04/19/19 2014 98.3 °F (36.8 °C) 96 18 152/70 97 % 04/19/19 1546 97.8 °F (36.6 °C) (!) 114 20 167/75 98 % 04/19/19 1239 98.2 °F (36.8 °C) (!) 108 24 157/82 96 % Intake/Output Summary (Last 24 hours) at 4/20/2019 1221 Last data filed at 4/20/2019 2139 Gross per 24 hour Intake 340.83 ml Output 700 ml Net -359.17 ml PHYSICAL EXAM: 
General: WD, WN. Alert, cooperative, no acute distress, mild to mod dyspnea at rest objectively but pt stated \" I am as usual\" EENT:  EOMI. Anicteric sclerae. MMM Resp:  Diminished BS bases bilaterally, poor inspiratory effort. No wheezing or rales. No accessory muscle use CV:  Regular  rhythm,  No edema GI:  Soft, Non distended, Non tender.  +Bowel sounds Neurologic:  Alert and oriented X 3, normal speech, Psych:   Good insight. Not anxious nor agitated Skin:  No rashes. No jaundice Reviewed most current lab test results and cultures  YES Reviewed most current radiology test results   YES Review and summation of old records today    NO Reviewed patient's current orders and MAR    YES 
PMH/SH reviewed - no change compared to H&P 
________________________________________________________________________ Care Plan discussed with: 
  Comments Patient y Family  y dtr and DIL at bedside RN y   
Care Manager y Consultant Multidiciplinary team rounds were held today with , nursing, pharmacist and clinical coordinator. Patient's plan of care was discussed; medications were reviewed and discharge planning was addressed. ________________________________________________________________________ Total NON critical care TIME:  35   Minutes Total CRITICAL CARE TIME Spent:   Minutes non procedure based Comments >50% of visit spent in counseling and coordination of care y Dc coordination   
________________________________________________________________________ Rachael Crandall MD  
 
Procedures: see electronic medical records for all procedures/Xrays and details which were not copied into this note but were reviewed prior to creation of Plan. LABS: 
I reviewed today's most current labs and imaging studies. Pertinent labs include: 
Recent Labs  
  04/20/19 
0510 04/19/19 
0457 04/18/19 
1658 WBC 6.8 6.4 9.0 HGB 10.9* 11.9 11.3* HCT 32.7* 35.8 33.5*  
 262 245 Recent Labs  
  04/20/19 
0510 04/19/19 
0457 04/18/19 
1658 * 130* 128* K 3.8 3.9 3.8 CL 96* 98 94* CO2 26 25 26 * 93 96 BUN 13 12 13 CREA 0.67 0.62 0.78  
CA 8.4* 8.5 8.6 MG 2.0  --   --   
PHOS 3.5  --   --   
ALB  --   --  3.8 TBILI  --   --  0.8 SGOT  --   --  24 ALT  --   --  31 Signed: Rachael Crandall MD

## 2019-04-20 NOTE — DISCHARGE INSTRUCTIONS
Patient Discharge Instructions    Mohamud Smith / [id number] : 1927    Admitted 2019 Discharged: 2019         DISCHARGE DIAGNOSIS:     Compression fracture of vertebra   Ribs fracture   -  Pain management: take tylenol 650 mg 4 times a day + oxycodone 5 mg PO every 6 hours as needed for pain                                     Do not take oxycodone and Tramadol together   -pain medications can cause constipation. You can use jones colace or miralax or dulcolax suppository for constipation      Stay well hydrated and eat healthy high fiber diet    -discuss with PCP diagnosis and management of osteoporosis     Hyponatremia ( low sodium)   - discuss with PCP if you should continue taking hydrochlorothiazide which can cause hyponatremia  - repeat blood work with PCP           Take Home Medications           General drug facts     If you have a very bad allergy, wear an allergy ID at all times. It is important that you take the medication exactly as they are prescribed. Keep your medication in the bottles provided by the pharmacist.  Keep a list of all your drugs (prescription, natural products, vitamins, OTC) with you. Give this list to your doctor. Do not take other medications without consulting your doctor. Do not share your drugs with others and do not take anyone else's drugs. Keep all drugs out of the reach of children and pets. Most drugs may be thrown away in household trash after mixing with coffee grounds or braeden litter and sealing in a plastic bag. Keep a list Call your doctor for help with any side effects. If in the [place] you may also call the FDA at 6-538-FDA-5658    Talk with the doctor before starting any new drug, including OTC, natural products, or vitamins. What to do at Home    1. Recommended diet:regular     2. Recommended activity: Activity as tolerated and PT/OT per Home Health    3.  If you experience any of the following symptoms then please call your primary care physician or return to the emergency room if you cannot get hold of your doctor: fever/chills/ worsening pain     4. Lab work:follow low sodium with pcp     5. Bring these papers with you to your follow up appointments. The papers will help your doctors be sure to continue the care plan from the hospital.      Follow-up with:   PCP: Summer Torres DO  Follow-up Information     Follow up With Specialties Details Why Contact Info    Summer Torres DO Family Practice In 3 days  [address] [facility]  [phone]      Que Winn MD Physical Medicine and Rehab  As needed for pain management/ compression fracture  [facility]  [address] [address] [facility] [address]             Please call for your own appointment        Information obtained by :  I understand that if any problems occur once I am at home I am to contact my physician. I understand and acknowledge receipt of the instructions indicated above.                                                                                                                                            Physician's or R.N.'s Signature                                                                  Date/Time                                                                                                                                              Patient or Representative Signature                                                          Date/Time

## 2019-04-20 NOTE — DISCHARGE SUMMARY
Hospitalist Discharge Summary     Patient ID:  Cecy Vick  [id number]  80 y.o.  2/13/1927    PCP on record: Yann Ortiz DO    Admit date: 4/18/2019  Discharge date and time: 4/20/2019      DISCHARGE DIAGNOSIS:    Subacute T9 compression fracture/ Rib fractures with intractable pain  Hyponatremia, chronic   Constipation due to pain medications  Essential HTN  COPD without acute exacerbation  Hypothyroidism  Hyperlipidemia  Code Status: DNR        CONSULTATIONS:  IP CONSULT TO ORTHOPEDIC SURGERY  IP CONSULT TO HOSPITALIST  IP CONSULT TO PULMONOLOGY    Excerpted HPI from H&P of Serenity Garzon MD:    Cecy Vick is a 80 y.o.  female who presents with above. Pt says that she was moving a ladder while hanging curtains about 3 weeks ago. She thinks she twisted when moving the ladder and developed severe back pain. She was found to have T9 compression fracture and managed conservatively with tramadol. However, over the last few weeks, she has continued to have severe pain. She has had worsening difficulty with ambulation. She has had loss of appetite. She denies fever or URI sx. No CP or SOB. She has thoracic back pain and L sided abdominal pain. She has not had a BM in quite some time. No new edema.     We were asked to admit for work up and evaluation         ______________________________________________________________________  DISCHARGE SUMMARY/HOSPITAL COURSE:  for full details see H&P, daily progress notes, labs, consult notes.      Subacute T9 compression fracture/ Rib fractures with intractable pain  -pain controlled with percocet + on scheduled tylenol   --> dc home on scheduled tylenol + oxy prn ( d/w DIL/pt/dtr) , will give 7 days supply then should follow with PCP  Continue aggressive bowel regiment   -seen by ortho, help was greatly appreciated:  Not a candidate for kyphoplasty  Up as tolerated with pain managment   Pt may follow up with Dr. Mp Gibson in 2 weeks   -MRI was reviewed by IR: not a candidate for kyphoplasty   -Bone scan: T9 compression fracture with fractures of left posterior ribs. No  evidence of metastatic disease.  - CT T spine with collapse of T9 with slight retropulsion. While new in the interval and remains age indeterminate. Gas within the superior aspect of the endplate favors against an acute injury.     Hyponatremia   -chronic, baseline Na ~ 128-132  -pt is on hctz at home , cont for now --> follow with pcp to decide on further use of hctz ( pcp knows pt better clinically)   Pain also may be contributing      Constipation due to pain medications  -supp offered pt prior to dc  -dc home on aggressive bowel regiment      Essential HTN  -BP stable   - con't lisinopril, HCTZ     COPD without acute exacerbation  - stable   Pt noted with mild to mod dyspnea at rest/with conversation but she stated \" I am as usual\". Dtr/ DIL confirmed that respiratory status at baseline   - duonebs scheduled BID as she does at home  - prn albuterol     Hypothyroidism: con't sythroid  Hyperlipidemia: con't lipitor           Code Status: DNR  Surrogate Decision Maker: son  DVT Prophylaxis: heparin      Baseline: ambulates with cane.  Son and dtr-in-law live with her. Recommended Disposition: PT recommending home with [facility]        _______________________________________________________________________  Patient seen and examined by me on discharge day. General:          WD, WN. Alert, cooperative, no acute distress, mild to mod dyspnea at rest objectively but pt stated \" I am as usual\"   EENT:              EOMI. Anicteric sclerae. MMM  Resp:               Diminished BS bases bilaterally, poor inspiratory effort. No wheezing or rales.   No accessory muscle use  CV:                  Regular  rhythm,  No edema  GI:                   Soft, Non distended, Non tender.  +Bowel sounds  Neurologic:      Alert and oriented X 3, normal speech,   Psych:             Good insight. Not anxious nor agitated  Skin:                No rashes.      _______________________________________________________________________  DISCHARGE MEDICATIONS:   Current Discharge Medication List      START taking these medications    Details   senna-docusate (PERICOLACE) 8.6-50 mg per tablet Take 2 Tabs by mouth two (2) times a day for 30 days. Qty: 120 Tab, Refills: 0      polyethylene glycol (MIRALAX) 17 gram packet Take 1 Packet by mouth daily. Qty: 30 Packet, Refills: 0      oxyCODONE IR (ROXICODONE) 5 mg immediate release tablet Take 1 Tab by mouth every six (6) hours as needed for Pain for up to 7 days. Max Daily Amount: 20 mg.  Qty: 28 Tab, Refills: 0    Associated Diagnoses: Compression fracture of vertebra (HCC)      bisacodyl (DULCOLAX, BISACODYL,) 10 mg suppository Insert 10 mg into rectum daily as needed for Other (constipation). Qty: 10 Suppository, Refills: 0      acetaminophen (TYLENOL) 325 mg tablet Take 2 Tabs by mouth four (4) times daily for 7 days. Qty: 56 Tab, Refills: 0         CONTINUE these medications which have NOT CHANGED    Details   magnesium citrate solution Drink one bottle. If you have not had large stool output, drink the second bottle. Qty: 2 Bottle, Refills: 0      Nebulizer & Compressor machine 1 Each by Does Not Apply route every four (4) hours as needed. As directed  Qty: 1 Each, Refills: 0      albuterol (ACCUNEB) 1.25 mg/3 mL nebu Take 3 mL by inhalation every four (4) hours as needed (wheezing). Qty: 25 Each, Refills: 0      PSYLLIUM SEED, WITH SUGAR, (METAMUCIL PO) Take  by mouth as needed. atorvastatin (LIPITOR) 20 mg tablet Take  by mouth daily. levothyroxine (SYNTHROID) 100 mcg tablet Take  by mouth Daily (before breakfast). lisinopril (PRINIVIL, ZESTRIL) 40 mg tablet Take 40 mg by mouth daily. albuterol (PROVENTIL HFA, VENTOLIN HFA) 90 mcg/actuation inhaler Take 1 Puff by inhalation.         hydrochlorothiazide (HYDRODIURIL) 25 mg tablet Take 25 mg by mouth daily. calcium-cholecalciferol, d3, (CALCIUM 600 + D) 600-125 mg-unit Tab Take  by mouth. omega-3 fatty acids-vitamin e (FISH OIL) 1,000 mg Cap Take 1 Cap by mouth. STOP taking these medications       traMADol (ULTRAM) 50 mg tablet Comments:   Reason for Stopping:               My Recommended Diet, Activity, Wound Care, and follow-up labs are listed in the patient's Discharge Insturctions which I have personally completed and reviewed.     ______________________________________________________________________    Risk of deterioration: Low    Condition at Discharge:  Stable  ______________________________________________________________________    Disposition  Home with family and home health services  ______________________________________________________________________    Care Plan discussed with:   Patient, Family, RN, Care Manager    ______________________________________________________________________    Code Status: DNR/DNI  ______________________________________________________________________      Follow up with:   PCP : Larose Duverney, DO  Follow-up Information     Follow up With Specialties Details Why Contact Info    Larose Duverney, DO Family Practice In 3 days  [address] [facility]  [phone]      Diya Swann MD Physical Medicine and Rehab  As needed for pain management/ compression fracture  [address]  [phone]                Total time in minutes spent coordinating this discharge (includes going over instructions, follow-up, prescriptions, and preparing report for sign off to her PCP) :  > 30  minutes    Signed:  Abel Almendarez MD

## 2019-04-20 NOTE — PROGRESS NOTES
Bedside and Verbal shift change report given to Tova KIRK (oncoming nurse) by Simon Watson RN/Kerri RN (offgoing nurse). Report included the following information SBAR, Intake/Output, Accordion and Recent Results.

## 2019-04-20 NOTE — PROGRESS NOTES
Problem: Falls - Risk of 
Goal: *Absence of Falls Description Document Michelle Ortega Fall Risk and appropriate interventions in the flowsheet. Outcome: Progressing Towards Goal 
  
Problem: Patient Education: Go to Patient Education Activity Goal: Patient/Family Education Outcome: Progressing Towards Goal 
  
Problem: Pressure Injury - Risk of 
Goal: *Prevention of pressure injury Description Document Juaquin Scale and appropriate interventions in the flowsheet. Outcome: Progressing Towards Goal 
  
Problem: Patient Education: Go to Patient Education Activity Goal: Patient/Family Education Outcome: Progressing Towards Goal 
  
Problem: Patient Education: Go to Patient Education Activity Goal: Patient/Family Education Outcome: Progressing Towards Goal

## 2019-04-20 NOTE — PROGRESS NOTES
Weekend CM reviewed medical record, followed up re: transitional care plans. Patient medically cleared for discharge home today Saturday 4/20/19. Discharge plan remains for Patient to return home with family (several relatives present at bedside), and family to provide d/c transportation. DME rolling walker issued at bedside. Resumption of care orders for [facility] RN, PT, OT sent to [facility] via allscripts as requested by Patient/Family. No additional questions/concerns reported at this time. CM provided emotional support, encouragement. VICKI Castro

## 2019-04-20 NOTE — PROGRESS NOTES
Home Health Care Discharge Planning: [facility] Face to Face Encounter NAME: Willian Ro :  1927 MRN:  [id number] Primary Diagnosis: compression fracture Date of Face to Face:  2019 1:53 PM        
                        
Face to Face Encounter findings are related to primary reason for home care:   YES 
 
1. I certify that the patient needs intermittent skilled nursing care, physical therapy and/or speech therapy. I will not be following this patient in the Community and Dr. Hakan Iniguez DO will be responsible for signing the [address] [facility] [facility] [address] 2. Initial Orders for Care: Skilled Nursing, Physical Therapy and Occupational Therapy 3. I certify that this patient is homebound because of illness or injury, need the aid of supportive devices such as crutches, canes, wheelchairs, and walkers; the use of special transportation; or the assistance of another person in order to leave their place of residence. There exists a normal inability to leave home and leaving home requires a considerable and taxing effort. 4. I certify that this patient is under my care and that I had a Face-to-Face Encounter that meets the physician Face-to-Face Encounter requirements. Document the physical findings from the Face-to-Face Encounter that support the need for skilled services: Has new diagnosis that requires skilled nursing teaching and intervention , Has new medications that requires skilled nursing teaching and monitoring for understanding and compliance , Needs skilled safety assessment and interventions  and Has new finding of weakness and altered mobility that requires skilled physical/occupational and/or speech therapy services for evaluation and interventions. Lexis Potter MD 
Discharging Physician Office: [phone] Fax:   [phone]

## 2019-04-20 NOTE — PROGRESS NOTES
notified of discharge orders and need for home health to be set up prior to discharge. Call back number [phone] given. Daughter and daughter in law notified of new changes in plan of care and new discharge orders. Opportunities for questions given.

## 2019-04-20 NOTE — PROGRESS NOTES
Problem: Self Care Deficits Care Plan (Adult) Goal: *Acute Goals and Plan of Care (Insert Text) Description Occupational Therapy Goals Initiated 4/20/2019 1. Patient will perform standing ADLs at sink with supervision/set-up within 7 day(s). 2.  Patient will perform upper body dressing and lower body dressing with supervision/set-up within 7 day(s). 3.  Patient will perform bathing with supervision/set-up within 7 day(s). 4.  Patient will perform toilet transfers with supervision/set-up within 7 day(s). 5.  Patient will perform all aspects of toileting with supervision/set-up within 7 day(s). Outcome: Progressing Towards Goal 
 
OCCUPATIONAL THERAPY EVALUATION Patient: Lacey Flynn (80 y.o. female) Date: 4/20/2019 Primary Diagnosis: Compression fracture of vertebra (HCC) Patsie Elijah Precautions:   DNR, Bed Alarm ASSESSMENT : 
Based on the objective data described below, patient presents with Setup upper body ADLs, Contact guard assistance lower body ADLs, and Contact guard assistance functional mobility with use of RW with occasional verbal cues for safety and proper RW management. Pt with anticipated discharge home with [facility] setup. She required CGA for transfers with verbal cues for RW management and safety in bathroom. Pt appears HAQUE with activity, although SpO2 95% on RA. Pt demonstrated cross leg to don and doff socks. Recommend RW use at home and 24 hour A/supervision. Spoke with CM who will order RW. The following are barriers to ADL independence while in acute care:  
- Cognitive and/or behavioral: safety awareness - Medical condition: ROM and strength   
- Other:    
 
Patient will benefit from skilled acute intervention to address the above impairments. Patient?s rehabilitation potential is considered to be Good Discharge recommendations: Home health (to increase independence and safety) 24 supervision Equipment recommendations for successful discharge (if) home: RW- ordered by CM prior to discharge home PLAN : 
Recommendations and Planned Interventions: self care training, functional mobility training, therapeutic exercise, balance training and home safety training Frequency/Duration: Patient will be followed by occupational therapy 4 times a week to address goals. SUBJECTIVE:  
Patient stated ? I understand. ? OBJECTIVE DATA SUMMARY:  
HISTORY:  
Past Medical History:  
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  
Procedure Laterality Date HX OOPHORECTOMY    
 RIGHT  
 HX THYROIDECTOMY PARTIAL Prior Level of Function/Environment/Context: lives with family, using SPC. Independent with ADLs Occupations in which the patient is/was successful, what are the barriers preventing that success:  
Performance Patterns (routines, roles, habits, and rituals):  
Personal Interests and/or values:  
Expanded or extensive additional review of patient history:  
 
Home Situation Home Environment: Private residence # Steps to Enter: 6 Rails to Enter: Yes Hand Rails : Left One/Two Story Residence: One story Living Alone: No 
Support Systems: Child(viviana), Family member(s) Patient Expects to be Discharged to[de-identified] Unknown Current DME Used/Available at Home: [place], straight, Walker, rollator, Galindo-Tang Tub or Shower Type: Tub/Shower combination Hand dominance: Right EXAMINATION OF PERFORMANCE DEFICITS: 
Cognitive/Behavioral Status: 
Neurologic State: Alert Skin: intact Edema: none noted Hearing: Auditory Auditory Impairment: None Vision/Perceptual: Tracking: Able to track stimulus in all quadrants w/o difficulty Range of Motion: 
B UEs [facility] Strength: 
Generally decreased, functional 
  
  
  
  
  
Coordination: 
  
Fine Motor Skills-Upper: Left Intact; Right Intact(intermittent tremors) Gross Motor Skills-Upper: Left Intact; Right Intact Balance: 
Sitting: Intact; Without support Standing: Impaired; With support Standing - Static: Good Standing - Dynamic : Fair Functional Mobility and Transfers for ADLs: 
Bed Mobility: 
 OOB in chair Transfers: 
Sit to Stand: Contact guard assistance;Assist x1 Stand to Sit: Contact guard assistance;Assist x1;Additional time Bed to Chair: Contact guard assistance;Assist x1(to occasional min A for RW mangement) Toilet Transfer : Minimum assistance;Assist x1;Additional time ADL Assessment: 
Feeding: Independent Oral Facial Hygiene/Grooming: Contact guard assistance Bathing: Minimum assistance Upper Body Dressing: Setup Lower Body Dressing: Contact guard assistance(donned/doffed socks cross leg) Toileting: Minimum assistance ADL Intervention and task modifications: 
 Educated pt on role of OT, safety with RW in bathroom and toilet transfers. Pt to receive RW from CM and family is looking into raised toilet seats. Pt plans to sponge bathe at home. Functional Measure: 
Barthel Index: 
Bathin Bladder: 10 Bowels: 10 
Groomin Dressing: 10 Feeding: 10 Mobility: 10 Stairs: 0 Toilet Use: 5 Transfer (Bed to Chair and Back): 10 Total: 70/100 Percentage of impairment  
0% 1-19% 20-39% 40-59% 60-79% 80-99% 100% Barthel Score 0-100 100 99-80 79-60 59-40 20-39 1-19 
 0 The Barthel ADL Index: Guidelines 1. The index should be used as a record of what a patient does, not as a record of what a patient could do.  
2. The main aim is to establish degree of independence from any help, physical or verbal, however minor and for whatever reason. 3. The need for supervision renders the patient not independent. 4. A patient's performance should be established using the best available evidence. Asking the patient, friends/relatives and nurses are the usual sources, but direct observation and common sense are also important. However direct testing is not needed. 5. Usually the patient's performance over the preceding 24-48 hours is important, but occasionally longer periods will be relevant. 6. Middle categories imply that the patient supplies over 50 per cent of the effort. 7. Use of aids to be independent is allowed. Darryle Chant., Barthel, D.W. (0010). Functional evaluation: the Barthel Index. [address] [place] [facility] [employer] (14)2. Paxton Keenan katy LON Patel, Jose Saxena., Ramon Ortega., Tino, [address] (1999). Measuring the change indisability after inpatient rehabilitation; comparison of the responsiveness of the Barthel Index and Functional [place] Measure. Journal of Neurology, Neurosurgery, and Psychiatry, 66(4), 946-052. Danilo Murcia NCARLOS AA, PATRICIA Negrete, & Bere Nolasco MEllenA. (2004.) Assessment of post-stroke quality of life in cost-effectiveness studies: The usefulness of the Barthel Index and the EuroQoL-5D. [facility], 62, 711-99 Occupational Therapy Evaluation Charge Determination History Examination Decision-Making LOW Complexity : Brief history review  LOW Complexity : 1-3 performance deficits relating to physical, cognitive , or psychosocial skils that result in activity limitations and / or participation restrictions  MEDIUM Complexity : Patient may present with comorbidities that affect occupational performnce. Miniml to moderate modification of tasks or assistance (eg, physical or verbal ) with assesment(s) is necessary to enable patient to complete evaluation Based on the above components, the patient evaluation is determined to be of the following complexity level: LOW Activity Tolerance:  
observed SOB with actity Please refer to the flowsheet for vital signs taken during this treatment. After treatment patient left:  
Up in chair Call light within reach RN notified COMMUNICATION/EDUCATION:  
The patient?s plan of care was discussed with: Physical Therapist and Registered Nurse. Patient/family have participated as able in goal setting and plan of care. This patient?s plan of care is appropriate for delegation to TATYANA. Thank you for this referral. 
Ruthanne Hodgkin, OT Time Calculation: 29 mins

## 2019-04-20 NOTE — PROGRESS NOTES
ADULT PROTOCOL: JET AEROSOL ASSESSMENT Patient  Campos Garcia     80 y.o.   female     4/20/2019  5:03 AM 
 
Breath Sounds Pre Procedure: Right Breath Sounds: Coarse, Diminished Left Breath Sounds: Coarse, Diminished Breath Sounds Post Procedure: Right Breath Sounds: Coarse, Diminished Left Breath Sounds: Coarse, Diminished Breathing pattern: Pre procedure Breathing Pattern: Regular Post procedure Breathing Pattern: Regular Heart Rate: Pre procedure Pulse: 103 Post procedure Pulse: 105 Resp Rate: Pre procedure Respirations: 20 
         Post procedure Respirations: 20 Peak Flow: Pre bronchodilator   n/a Post bronchodilator   n/a Incentive Spirometry:  Actual Volume (ml): 1250 ml 
     
 
Cough: Pre procedure Cough: Non-productive Post procedure Cough: Non-productive Sputum: Pre procedure  n/a Post procedure  n/a Oxygen: O2 Device: Nasal cannula   Flow rate (L/min) 2 Changed: NO SpO2: Pre procedure SpO2: 98 %   with oxygen Post procedure SpO2: 94 %  with oxygen Nebulizer Therapy: Current medications Aerosolized Medications: DuoNeb Changed: NO Smoking History: yes Problem List:  
Patient Active Problem List  
Diagnosis Code  
 HTN (hypertension) I10  
 Dyslipidemia E78.5  Chronic airway obstruction, not elsewhere classified J44.9  Carotid artery calcification I65.29  
 Tobacco use disorder F17.200  Intraductal papilloma of breast D24.9  Breast cancer ([facility] C50.919  
 Compression fracture of vertebra ([facility] M48.50XA Respiratory Therapist: Guerrero Dukes RT

## 2019-04-20 NOTE — PROGRESS NOTES
Pt given education regarding discharge instructions, prescriptions, prescription literature, and extra dressings. Opportunities for questions given. PIV taken out with cath tip intact. Pt dishcarged home with daughter and daughter in law with personal belongings and walker.

## 2019-05-28 ENCOUNTER — OFFICE VISIT (OUTPATIENT)
Dept: NEUROLOGY | Age: 84
End: 2019-05-28

## 2019-05-28 VITALS
HEART RATE: 72 BPM | DIASTOLIC BLOOD PRESSURE: 80 MMHG | WEIGHT: 124 LBS | HEIGHT: 60 IN | OXYGEN SATURATION: 98 % | SYSTOLIC BLOOD PRESSURE: 122 MMHG | BODY MASS INDEX: 24.35 KG/M2

## 2019-05-28 DIAGNOSIS — E78.01 FAMILIAL HYPERCHOLESTEROLEMIA: ICD-10-CM

## 2019-05-28 DIAGNOSIS — I10 ESSENTIAL HYPERTENSION: ICD-10-CM

## 2019-05-28 DIAGNOSIS — S22.070S CLOSED WEDGE COMPRESSION FRACTURE OF TENTH THORACIC VERTEBRA, SEQUELA: ICD-10-CM

## 2019-05-28 DIAGNOSIS — R41.0 DISORIENTATION: Primary | ICD-10-CM

## 2019-05-28 DIAGNOSIS — J43.1 PANLOBULAR EMPHYSEMA (HCC): ICD-10-CM

## 2019-05-28 RX ORDER — ACETAMINOPHEN 325 MG/1
TABLET ORAL
COMMUNITY

## 2019-05-28 RX ORDER — OXYCODONE HYDROCHLORIDE 5 MG/1
5 TABLET ORAL
COMMUNITY
End: 2019-11-25 | Stop reason: ALTCHOICE

## 2019-05-28 RX ORDER — SENNOSIDES 8.6 MG/1
1 TABLET ORAL 2 TIMES DAILY
COMMUNITY

## 2019-05-28 NOTE — PATIENT INSTRUCTIONS
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
## 2019-06-07 ENCOUNTER — HOSPITAL ENCOUNTER (OUTPATIENT)
Dept: NEUROLOGY | Age: 84
Discharge: HOME | End: 2019-06-07
Attending: PSYCHIATRY & NEUROLOGY
Payer: MEDICARE

## 2019-06-07 ENCOUNTER — TELEPHONE (OUTPATIENT)
Dept: NEUROLOGY | Age: 84
End: 2019-06-07

## 2019-06-07 ENCOUNTER — HOSPITAL ENCOUNTER (OUTPATIENT)
Dept: CT IMAGING | Age: 84
Discharge: HOME | End: 2019-06-07
Attending: PSYCHIATRY & NEUROLOGY
Payer: MEDICARE

## 2019-06-07 DIAGNOSIS — R41.0 DISORIENTATION: ICD-10-CM

## 2019-06-07 PROCEDURE — 70450 CT HEAD/BRAIN W/O DYE: CPT

## 2019-06-07 PROCEDURE — 95816 EEG AWAKE AND DROWSY: CPT

## 2019-06-07 NOTE — TELEPHONE ENCOUNTER
----- Message from Noris Larson sent at 6/7/2019  1:05 PM EDT -----  Regarding: Dr. Nubia Emmanuel: [phone]  James Card (daughter in law) called to report that pt has had all her test done. And when results come in please call.

## 2019-06-11 ENCOUNTER — TELEPHONE (OUTPATIENT)
Dept: NEUROLOGY | Age: 84
End: 2019-06-11

## 2019-06-11 NOTE — TELEPHONE ENCOUNTER
----- Message from [facility] sent at 6/11/2019  8:52 AM EDT -----  Regarding: Dr Velma Posey telephone  Blade Gamez, daughter in law, is requesting a callback to receive the pt's EEG and CT scan results.       Best contact number is [phone]

## 2019-06-11 NOTE — TELEPHONE ENCOUNTER
Spoke with patient's daughter, advised Dr. Cindy eMndoza was out of the office however there request for the results have been sent. Advised once the information has been received will contact with that information.

## 2019-06-13 NOTE — TELEPHONE ENCOUNTER
Alfredo Marvin MD   Physician   Neurology   Telephone Encounter   Signed   Encounter Date:  6/11/2019                        []Hide copied text    []Hover for details      CT looks good. The final report on EEG not out yet but it looks good as well. I took a peek.             Provided the above information

## 2019-06-14 NOTE — PROCEDURES
[facility], [address]      Electroencephalogram         Procedure Date: 06/07/2019    Procedure ID: MRA     Procedure Type: Routine    Patient Name: Karena Morel     YOB: 1927    Medical Record No: [id number]    INDICATION: Altered mental status, Syncope    Medications:  Current Outpatient Medications   Medication Sig    acetaminophen (TYLENOL) 325 mg tablet Take  by mouth every four (4) hours as needed for Pain.  oxyCODONE IR (ROXICODONE) 5 mg immediate release tablet Take 5 mg by mouth every six (6) hours as needed for Pain.  senna (SENNA) 8.6 mg tablet Take 1 Tab by mouth two (2) times a day.  polyethylene glycol (MIRALAX) 17 gram packet Take 1 Packet by mouth daily.  bisacodyl (DULCOLAX, BISACODYL,) 10 mg suppository Insert 10 mg into rectum daily as needed for Other (constipation).  magnesium citrate solution Drink one bottle. If you have not had large stool output, drink the second bottle.  Nebulizer & Compressor machine 1 Each by Does Not Apply route every four (4) hours as needed. As directed    albuterol (ACCUNEB) 1.25 mg/3 mL nebu Take 3 mL by inhalation every four (4) hours as needed (wheezing).  PSYLLIUM SEED, WITH SUGAR, (METAMUCIL PO) Take  by mouth as needed.  atorvastatin (LIPITOR) 20 mg tablet Take  by mouth daily.  levothyroxine (SYNTHROID) 100 mcg tablet Take  by mouth Daily (before breakfast).  lisinopril (PRINIVIL, ZESTRIL) 40 mg tablet Take 40 mg by mouth daily.  albuterol (PROVENTIL HFA, VENTOLIN HFA) 90 mcg/actuation inhaler Take 1 Puff by inhalation.  hydrochlorothiazide (HYDRODIURIL) 25 mg tablet Take 25 mg by mouth daily.  calcium-cholecalciferol, d3, (CALCIUM 600 + D) 600-125 mg-unit Tab Take  by mouth.  omega-3 fatty acids-vitamin e (FISH OIL) 1,000 mg Cap Take 1 Cap by mouth.        No current facility-administered medications for this encounter. DESCRIPTION OF PROCEDURE: Electrodes were applied in accordance with the international 10-20 system of electrode placement. EEG was reviewed in both bipolar and referential montages    Description of Activity:  During wakefulness, there is continuous runs of 8-9 Hz symmetric posterior alpha rhythm, that attenuate symmetrically with eye opening. Low voltage beta activity occurs symmetrically at the anterior head regions bilaterally. During drowsiness, there is attenuation of the alpha rhythm and low voltage theta activity occurs bilaterally. Sleep spindles occur and are symmetric. Intermittent photic stimulation was performed and did not induce posterior driving responses. No sharp or spike discharges, seizures or epileptiform discharges seen. No focal asymmetry. Orobuccal movements observed when in light sleep. No epileptogenic seizure correlate. Clinical Interpretation: This EEG, performed during wakefulness and sleep is normal. There is no focal asymmetry, seizures or epileptiform discharges seen.

## 2019-11-25 ENCOUNTER — OFFICE VISIT (OUTPATIENT)
Dept: URGENT CARE | Age: 84
End: 2019-11-25

## 2019-11-25 VITALS
SYSTOLIC BLOOD PRESSURE: 124 MMHG | DIASTOLIC BLOOD PRESSURE: 58 MMHG | HEART RATE: 78 BPM | BODY MASS INDEX: 24.35 KG/M2 | WEIGHT: 124 LBS | RESPIRATION RATE: 18 BRPM | HEIGHT: 60 IN | OXYGEN SATURATION: 96 % | TEMPERATURE: 97.8 F

## 2019-11-25 DIAGNOSIS — S00.11XA CONTUSION OF RIGHT EYEBROW, INITIAL ENCOUNTER: ICD-10-CM

## 2019-11-25 DIAGNOSIS — S60.511A ABRASION OF RIGHT HAND, INITIAL ENCOUNTER: ICD-10-CM

## 2019-11-25 DIAGNOSIS — S20.211A RIB CONTUSION, RIGHT, INITIAL ENCOUNTER: ICD-10-CM
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
## 2019-11-25 RX ORDER — BACLOFEN 10 MG/1
10 TABLET ORAL 2 TIMES DAILY
Qty: 20 TAB | Refills: 0 | Status: SHIPPED | OUTPATIENT
Start: 2019-11-25

## 2019-11-25 RX ORDER — TRAMADOL HYDROCHLORIDE 50 MG/1
50 TABLET ORAL
Qty: 12 TAB | Refills: 0 | Status: SHIPPED | OUTPATIENT
Start: 2019-11-25 | End: 2019-11-28

## 2019-11-25 NOTE — PATIENT INSTRUCTIONS
Scrapes (Abrasions): Care Instructions  Your Care Instructions  Scrapes (abrasions) are wounds where your skin has been rubbed or torn off. Most scrapes do not go deep into the skin, but some may remove several layers of skin. Scrapes usually don't bleed much, but they may ooze pinkish fluid. Scrapes on the head or face may appear worse than they are. They may bleed a lot because of the good blood supply to this area. Most scrapes heal well and may not need a bandage. They usually heal within 3 to 7 days. A large, deep scrape may take 1 to 2 weeks or longer to heal. A scab may form on some scrapes. Follow-up care is a key part of your treatment and safety. Be sure to make and go to all appointments, and call your doctor if you are having problems. It's also a good idea to know your test results and keep a list of the medicines you take. How can you care for yourself at home? · If your doctor told you how to care for your wound, follow your doctor's instructions. If you did not get instructions, follow this general advice:  ? Wash the scrape with clean water 2 times a day. Don't use hydrogen peroxide or alcohol, which can slow healing. ? You may cover the scrape with a thin layer of petroleum jelly, such as Vaseline, and a nonstick bandage. ? Apply more petroleum jelly and replace the bandage as needed. · Prop up the injured area on a pillow anytime you sit or lie down during the next 3 days. Try to keep it above the level of your heart. This will help reduce swelling. · Be safe with medicines. Take pain medicines exactly as directed. ? If the doctor gave you a prescription medicine for pain, take it as prescribed. ? If you are not taking a prescription pain medicine, ask your doctor if you can take an over-the-counter medicine. When should you call for help?   Call your doctor now or seek immediate medical care if:    · You have signs of infection, such as:  ? Increased pain, swelling, warmth, or redness around the scrape. ? Red streaks leading from the scrape. ? Pus draining from the scrape. ? A fever.     · The scrape starts to bleed, and blood soaks through the bandage. Oozing small amounts of blood is normal.    Watch closely for changes in your health, and be sure to contact your doctor if the scrape is not getting better each day. Where can you learn more? Go to http://vivian-millicent.info/. Enter A374 in the search box to learn more about \"Scrapes (Abrasions): Care Instructions. \"  Current as of: June 26, 2019  Content Version: 12.2  © 3848-1976 [employer]. Care instructions adapted under license by [employer] (which disclaims liability or warranty for this information). If you have questions about a medical condition or this instruction, always ask your healthcare professional. [place] [address] any warranty or liability for your use of this information. Bruised Rib: Care Instructions  Overview    You can get a bruised rib if you fall or get hit, such as in an accident or while playing sports. The medical term for a bruise is \"contusion. \" Small blood vessels get torn and leak blood under the skin. Most people think of a bruise as a black-and-blue area. But bones and muscles can also get bruised. An injury may damage the rib but not cause a bruise that you can see. Sometimes it can be hard to tell if a rib is bruised or broken. The symptoms may be the same. And a broken bone can't always be seen on an X-ray. But the treatment for a bruised rib is often the same as treatment for a broken one. An injury to the ribs can cause pain. The pain may be worse when you breathe deeply, cough, or sneeze. In most cases, a bruised rib will heal on its own. You can take pain medicine while the rib mends. Pain relief allows you to take deep breaths. Follow-up care is a key part of your treatment and safety.  Be sure to make and go to all appointments, and call your doctor if you are having problems. It's also a good idea to know your test results and keep a list of the medicines you take. How can you care for yourself at home? · Rest and protect the injured or sore area. Stop, change, or take a break from any activity that causes pain. · Put ice or a cold pack on the area for 10 to 20 minutes at a time. Put a thin cloth between the ice and your skin. · After 2 or 3 days, if your swelling is gone, put a heating pad set on low or a warm cloth on your chest. Some doctors suggest that you go back and forth between hot and cold. Put a thin cloth between the heating pad and your skin. · Ask your doctor if you can take an over-the-counter pain medicine, such as acetaminophen (Tylenol), ibuprofen (Advil, Motrin), or naproxen (Aleve). Be safe with medicines. Read and follow all instructions on the label. · As your pain gets better, slowly return to your normal activities. Be patient. Rib bruises can take weeks or months to heal. If the pain gets worse, it may be a sign that you need to rest a while longer. When should you call for help? Call 911 anytime you think you may need emergency care. For example, call if:    · You have severe trouble breathing.     Call your doctor now or seek immediate medical care if:    · You have trouble breathing.     · You have a fever.     · You have a new or worse cough.     · You have new or worse pain.    Watch closely for changes in your health, and be sure to contact your doctor if:    · You do not get better as expected. Where can you learn more? Go to http://vivian-millicent.info/. Enter R125 in the search box to learn more about \"Bruised Rib: Care Instructions. \"  Current as of: June 26, 2019  Content Version: 12.2  © 2693-1914 [employer]. Care instructions adapted under license by [email or web address] (which disclaims liability or warranty for this information).  If you have questions about a medical condition or this instruction, always ask your healthcare professional. [place] [address] any warranty or liability for your use of this information.

## 2019-11-25 NOTE — PROGRESS NOTES
Rib Injury   This is a new problem. The current episode started 1 to 2 hours ago (h/o fall on side ). The problem occurs constantly. The problem has not changed since onset. Associated symptoms include chest pain (rt side rib/ chest wall pain ). The symptoms are aggravated by bending and twisting. She has tried nothing for the symptoms. Past Medical History:   Diagnosis Date    Arthritis     Breast cancer ([address] 4/4/2013    Carotid artery calcification     Constipation     COPD (chronic obstructive pulmonary disease) (HCC)     Dyslipidemia     Hypertension     Intraductal papilloma of breast 3/21/2013    Psoriasis     Thyroid adenoma     Thyroid disease         Past Surgical History:   Procedure Laterality Date    HX OOPHORECTOMY      RIGHT    HX THYROIDECTOMY      PARTIAL         Family History   Problem Relation Age of Onset    Stroke Mother         CEREBRAL HEMMORRHAGE    Cancer Father         LUNG    Cancer Daughter         LUNG/SPINE        Social History     Socioeconomic History    Marital status:      Spouse name: Not on file    Number of children: Not on file    Years of education: Not on file    Highest education level: Not on file   Occupational History    Not on file   Social Needs    Financial resource strain: Not on file    Food insecurity:     Worry: Not on file     Inability: Not on file    Transportation needs:     Medical: Not on file     Non-medical: Not on file   Tobacco Use    Smoking status: Current Every Day Smoker     Packs/day: 0.75     Years: 45.00     Pack years: 33.75    Smokeless tobacco: Never Used   Substance and Sexual Activity    Alcohol use:  Yes     Alcohol/week: 5.8 standard drinks     Types: 7 Cans of beer per week    Drug use: Never    Sexual activity: Not Currently   Lifestyle    Physical activity:     Days per week: Not on file     Minutes per session: Not on file    Stress: Not on file   Relationships    Social connections: Talks on phone: Not on file     Gets together: Not on file     Attends [employer]odoxy service: Not on file     Active member of club or organization: Not on file     Attends meetings of clubs or organizations: Not on file     Relationship status: Not on file    Intimate partner violence:     Fear of current or ex partner: Not on file     Emotionally abused: Not on file     Physically abused: Not on file     Forced sexual activity: Not on file   Other Topics Concern    Not on file   Social History Narrative    Not on file                ALLERGIES: Patient has no known allergies. Review of Systems   HENT:        Injury on rt forehead with abrasion    Cardiovascular: Positive for chest pain (rt side rib/ chest wall pain ). Musculoskeletal: Negative for gait problem. Abrasion on rt hand  Movement- normal    All other systems reviewed and are negative. Vitals:    11/25/19 1617   BP: 124/58   Pulse: 78   Resp: 18   Temp: 97.8 °F (36.6 °C)   SpO2: 96%   Weight: 124 lb (56.2 kg)   Height: 5' (1.524 m)       Physical Exam  Vitals signs and nursing note reviewed. Exam conducted with a chaperone present. Constitutional:       General: She is not in acute distress. HENT:      Head: Abrasion and contusion (on rt eyebrow) present. No raccoon eyes or right periorbital erythema. Jaw: No tenderness or swelling. Nose: Nose normal.   Chest:      Chest wall: Tenderness present. No swelling, crepitus or edema. Musculoskeletal:      Right shoulder: Normal.      Right elbow: Normal.     Right wrist: Normal.      Right hip: Normal.      Left hip: Normal.      Cervical back: Normal.      Lumbar back: Normal.      Right hand: She exhibits normal range of motion, no tenderness and no laceration (abrasion on 4-5 knuckles area). Hands:    Neurological:      Mental Status: She is alert. Gait: Gait (her baseline ) normal.         MDM    Procedures      ICD-10-CM ICD-9-CM    1.  Fall, initial encounter W19. XXXA E888.9 XR RIBS RT W PA CXR MIN 3 V   2. Rib contusion, right, initial encounter S20.211A 922.1 traMADol (ULTRAM) 50 mg tablet   3. Contusion of right eyebrow, initial encounter S00. 11XA 920     with superficial abrasion    4. Abrasion of right hand, initial encounter S60.511A 914.0      Medications Ordered Today   Medications    baclofen (LIORESAL) 10 mg tablet     Sig: Take 1 Tab by mouth two (2) times a day. Dispense:  20 Tab     Refill:  0    traMADol (ULTRAM) 50 mg tablet     Sig: Take 1 Tab by mouth every six (6) hours as needed for Pain for up to 3 days. Max Daily Amount: 200 mg. Dispense:  12 Tab     Refill:  0     Results for orders placed or performed in visit on 11/25/19   XR RIBS RT W PA CXR MIN 3 V    Narrative    INDICATION:  rib pain after falling today. COMPARISON: CXR 4/18/2019. Three views of the right ribs and a frontal CXR are obtained. Although a displaced rib fracture is not seen, nondisplaced rib fractures are  considered likely since there is new multilevel anterolateral right pleural  thickening/edema. Bones are osteopenic. Lungs are clear. Heart size is normal. There is no pneumothorax or pleural  effusion. Impression    IMPRESSION: Suspect nondisplaced right rib fractures as discussed. No acute  cardiopulmonary disease. The patients condition was discussed with the patient and they understand. The patient is to follow up with primary care doctor. If signs and symptoms become worse the pt is to go to the ER. The patient is to take medications as prescribed.

## 2019-12-04 ENCOUNTER — APPOINTMENT (OUTPATIENT)
Dept: CT IMAGING | Age: 84
End: 2019-12-04
Attending: EMERGENCY MEDICINE
Payer: MEDICARE

## 2019-12-04 ENCOUNTER — APPOINTMENT (OUTPATIENT)
Dept: GENERAL RADIOLOGY | Age: 84
End: 2019-12-04
Attending: EMERGENCY MEDICINE
Payer: MEDICARE

## 2019-12-04 ENCOUNTER — HOSPITAL ENCOUNTER (EMERGENCY)
Age: 84
Discharge: ANOTHER HOSPITAL | End: 2019-12-04
Attending: EMERGENCY MEDICINE
Payer: MEDICARE

## 2019-12-04 VITALS
RESPIRATION RATE: 13 BRPM | SYSTOLIC BLOOD PRESSURE: 93 MMHG | WEIGHT: 124 LBS | TEMPERATURE: 99 F | HEART RATE: 118 BPM | BODY MASS INDEX: 24.35 KG/M2 | DIASTOLIC BLOOD PRESSURE: 45 MMHG | OXYGEN SATURATION: 94 % | HEIGHT: 60 IN

## 2019-12-04 DIAGNOSIS — R06.03 RESPIRATORY DISTRESS: Primary | ICD-10-CM

## 2019-12-04 DIAGNOSIS — J90 PLEURAL EFFUSION: ICD-10-CM
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
## 2019-12-04 DIAGNOSIS — S22.050A COMPRESSION FRACTURE OF T6 VERTEBRA, INITIAL ENCOUNTER (HCC): ICD-10-CM

## 2019-12-04 DIAGNOSIS — J18.9 PNEUMONIA OF RIGHT LOWER LOBE DUE TO INFECTIOUS ORGANISM: ICD-10-CM

## 2019-12-04 DIAGNOSIS — S22.5XXA CLOSED FRACTURE OF MULTIPLE RIBS WITH FLAIL CHEST, INITIAL ENCOUNTER: ICD-10-CM

## 2019-12-04 LAB
ALBUMIN SERPL-MCNC: 3 G/DL (ref 3.5–5)
ALBUMIN/GLOB SERPL: 0.9 {RATIO} (ref 1.1–2.2)
ALP SERPL-CCNC: 94 U/L (ref 45–117)
ALT SERPL-CCNC: 28 U/L (ref 12–78)
ANION GAP SERPL CALC-SCNC: 9 MMOL/L (ref 5–15)
APPEARANCE UR: CLEAR
ARTERIAL PATENCY WRIST A: YES
AST SERPL-CCNC: 24 U/L (ref 15–37)
ATRIAL RATE: 137 BPM
BACTERIA URNS QL MICRO: NEGATIVE /HPF
BASE DEFICIT BLD-SCNC: 2 MMOL/L
BASOPHILS # BLD: 0 K/UL (ref 0–0.1)
BASOPHILS NFR BLD: 0 % (ref 0–1)
BDY SITE: ABNORMAL
BILIRUB SERPL-MCNC: 0.8 MG/DL (ref 0.2–1)
BILIRUB UR QL CFM: NEGATIVE
BNP SERPL-MCNC: 2134 PG/ML
BUN SERPL-MCNC: 22 MG/DL (ref 6–20)
BUN/CREAT SERPL: 24 (ref 12–20)
CALCIUM SERPL-MCNC: 8.1 MG/DL (ref 8.5–10.1)
CALCULATED P AXIS, ECG09: 61 DEGREES
CALCULATED R AXIS, ECG10: 8 DEGREES
CALCULATED T AXIS, ECG11: 94 DEGREES
CHLORIDE SERPL-SCNC: 96 MMOL/L (ref 97–108)
CO2 SERPL-SCNC: 25 MMOL/L (ref 21–32)
COLOR UR: ABNORMAL
CREAT SERPL-MCNC: 0.9 MG/DL (ref 0.55–1.02)
DIAGNOSIS, 93000: NORMAL
DIFFERENTIAL METHOD BLD: ABNORMAL
EOSINOPHIL # BLD: 0 K/UL (ref 0–0.4)
EOSINOPHIL NFR BLD: 0 % (ref 0–7)
EPITH CASTS URNS QL MICRO: ABNORMAL /LPF
ERYTHROCYTE [DISTWIDTH] IN BLOOD BY AUTOMATED COUNT: 20.2 % (ref 11.5–14.5)
GAS FLOW.O2 O2 DELIVERY SYS: ABNORMAL L/MIN
GAS FLOW.O2 SETTING OXYMISER: 2 L/M
GLOBULIN SER CALC-MCNC: 3.5 G/DL (ref 2–4)
GLUCOSE SERPL-MCNC: 120 MG/DL (ref 65–100)
GLUCOSE UR STRIP.AUTO-MCNC: NEGATIVE MG/DL
HCO3 BLD-SCNC: 23.6 MMOL/L (ref 22–26)
HCT VFR BLD AUTO: 30.8 % (ref 35–47)
HGB BLD-MCNC: 10 G/DL (ref 11.5–16)
HGB UR QL STRIP: NEGATIVE
HYALINE CASTS URNS QL MICRO: ABNORMAL /LPF (ref 0–5)
IMM GRANULOCYTES # BLD AUTO: 0 K/UL (ref 0–0.04)
IMM GRANULOCYTES NFR BLD AUTO: 0 % (ref 0–0.5)
KETONES UR QL STRIP.AUTO: NEGATIVE MG/DL
LACTATE BLD-SCNC: 2.02 MMOL/L (ref 0.4–2)
LACTATE SERPL-SCNC: 1.2 MMOL/L (ref 0.4–2)
LEUKOCYTE ESTERASE UR QL STRIP.AUTO: NEGATIVE
LYMPHOCYTES # BLD: 0.6 K/UL (ref 0.8–3.5)
LYMPHOCYTES NFR BLD: 4 % (ref 12–49)
MCH RBC QN AUTO: 30.6 PG (ref 26–34)
MCHC RBC AUTO-ENTMCNC: 32.5 G/DL (ref 30–36.5)
MCV RBC AUTO: 94.2 FL (ref 80–99)
MONOCYTES # BLD: 0.3 K/UL (ref 0–1)
MONOCYTES NFR BLD: 2 % (ref 5–13)
NEUTS BAND NFR BLD MANUAL: 7 %
NEUTS SEG # BLD: 14.8 K/UL (ref 1.8–8)
NEUTS SEG NFR BLD: 87 % (ref 32–75)
NITRITE UR QL STRIP.AUTO: NEGATIVE
NRBC # BLD: 0.04 K/UL (ref 0–0.01)
NRBC BLD-RTO: 0.3 PER 100 WBC
P-R INTERVAL, ECG05: 124 MS
PCO2 BLD: 44 MMHG (ref 35–45)
PH BLD: 7.34 [PH] (ref 7.35–7.45)
PH UR STRIP: 5.5 [PH] (ref 5–8)
PLATELET # BLD AUTO: 290 K/UL (ref 150–400)
PMV BLD AUTO: 9.8 FL (ref 8.9–12.9)
PO2 BLD: 77 MMHG (ref 80–100)
POTASSIUM SERPL-SCNC: 4.4 MMOL/L (ref 3.5–5.1)
PROT SERPL-MCNC: 6.5 G/DL (ref 6.4–8.2)
PROT UR STRIP-MCNC: 30 MG/DL
Q-T INTERVAL, ECG07: 298 MS
QRS DURATION, ECG06: 84 MS
QTC CALCULATION (BEZET), ECG08: 449 MS
RBC # BLD AUTO: 3.27 M/UL (ref 3.8–5.2)
RBC #/AREA URNS HPF: ABNORMAL /HPF (ref 0–5)
RBC MORPH BLD: ABNORMAL
SAO2 % BLD: 94 % (ref 92–97)
SODIUM SERPL-SCNC: 130 MMOL/L (ref 136–145)
SP GR UR REFRACTOMETRY: 1.02 (ref 1–1.03)
SPECIMEN TYPE: ABNORMAL
TOTAL RESP. RATE, ITRR: 25
TROPONIN I SERPL-MCNC: <0.05 NG/ML
UA: UC IF INDICATED,UAUC: ABNORMAL
UROBILINOGEN UR QL STRIP.AUTO: 1 EU/DL (ref 0.2–1)
VENTRICULAR RATE, ECG03: 137 BPM
WBC # BLD AUTO: 15.7 K/UL (ref 3.6–11)
WBC URNS QL MICRO: ABNORMAL /HPF (ref 0–4)

## 2019-12-04 PROCEDURE — 71045 X-RAY EXAM CHEST 1 VIEW: CPT

## 2019-12-04 PROCEDURE — 83605 ASSAY OF LACTIC ACID: CPT

## 2019-12-04 PROCEDURE — 87185 SC STD ENZYME DETCJ PER NZM: CPT

## 2019-12-04 PROCEDURE — 77010033678 HC OXYGEN DAILY

## 2019-12-04 PROCEDURE — 36600 WITHDRAWAL OF ARTERIAL BLOOD: CPT

## 2019-12-04 PROCEDURE — 74011250637 HC RX REV CODE- 250/637: Performed by: EMERGENCY MEDICINE

## 2019-12-04 PROCEDURE — 74011250636 HC RX REV CODE- 250/636: Performed by: EMERGENCY MEDICINE

## 2019-12-04 PROCEDURE — 82803 BLOOD GASES ANY COMBINATION: CPT

## 2019-12-04 PROCEDURE — 77030029684 HC NEB SM VOL KT MONA -A

## 2019-12-04 PROCEDURE — 94644 CONT INHLJ TX 1ST HOUR: CPT

## 2019-12-04 PROCEDURE — 87040 BLOOD CULTURE FOR BACTERIA: CPT

## 2019-12-04 PROCEDURE — 96365 THER/PROPH/DIAG IV INF INIT: CPT

## 2019-12-04 PROCEDURE — 96375 TX/PRO/DX INJ NEW DRUG ADDON: CPT

## 2019-12-04 PROCEDURE — 70450 CT HEAD/BRAIN W/O DYE: CPT

## 2019-12-04 PROCEDURE — 71250 CT THORAX DX C-: CPT

## 2019-12-04 PROCEDURE — 99285 EMERGENCY DEPT VISIT HI MDM: CPT

## 2019-12-04 PROCEDURE — 84484 ASSAY OF TROPONIN QUANT: CPT

## 2019-12-04 PROCEDURE — 80053 COMPREHEN METABOLIC PANEL: CPT

## 2019-12-04 PROCEDURE — 93005 ELECTROCARDIOGRAM TRACING: CPT

## 2019-12-04 PROCEDURE — 96376 TX/PRO/DX INJ SAME DRUG ADON: CPT

## 2019-12-04 PROCEDURE — 85025 COMPLETE CBC W/AUTO DIFF WBC: CPT

## 2019-12-04 PROCEDURE — 36415 COLL VENOUS BLD VENIPUNCTURE: CPT

## 2019-12-04 PROCEDURE — 87077 CULTURE AEROBIC IDENTIFY: CPT

## 2019-12-04 PROCEDURE — 74011000258 HC RX REV CODE- 258: Performed by: EMERGENCY MEDICINE

## 2019-12-04 PROCEDURE — 83880 ASSAY OF NATRIURETIC PEPTIDE: CPT

## 2019-12-04 PROCEDURE — 81001 URINALYSIS AUTO W/SCOPE: CPT

## 2019-12-04 PROCEDURE — 74011000250 HC RX REV CODE- 250: Performed by: EMERGENCY MEDICINE

## 2019-12-04 RX ORDER — LORAZEPAM 2 MG/ML
1 INJECTION INTRAMUSCULAR
Status: COMPLETED | OUTPATIENT
Start: 2019-12-04 | End: 2019-12-04

## 2019-12-04 RX ORDER — ALBUTEROL SULFATE 0.83 MG/ML
10 SOLUTION RESPIRATORY (INHALATION)
Status: COMPLETED | OUTPATIENT
Start: 2019-12-04 | End: 2019-12-04

## 2019-12-04 RX ORDER — IPRATROPIUM BROMIDE 0.5 MG/2.5ML
0.5 SOLUTION RESPIRATORY (INHALATION)
Status: COMPLETED | OUTPATIENT
Start: 2019-12-04 | End: 2019-12-04

## 2019-12-04 RX ORDER — FUROSEMIDE 10 MG/ML
20 INJECTION INTRAMUSCULAR; INTRAVENOUS
Status: COMPLETED | OUTPATIENT
Start: 2019-12-04 | End: 2019-12-04

## 2019-12-04 RX ORDER — AZITHROMYCIN 250 MG/1
500 TABLET, FILM COATED ORAL
Status: COMPLETED | OUTPATIENT
Start: 2019-12-04 | End: 2019-12-04

## 2019-12-04 RX ORDER — SODIUM CHLORIDE 9 MG/ML
100 INJECTION, SOLUTION INTRAVENOUS CONTINUOUS
Status: DISCONTINUED | OUTPATIENT
Start: 2019-12-04 | End: 2019-12-05 | Stop reason: HOSPADM

## 2019-12-04 RX ORDER — HYDROCODONE BITARTRATE AND ACETAMINOPHEN 5; 325 MG/1; MG/1
1 TABLET ORAL ONCE
Status: COMPLETED | OUTPATIENT
Start: 2019-12-04 | End: 2019-12-04

## 2019-12-04 RX ADMIN — AZITHROMYCIN MONOHYDRATE 500 MG: 250 TABLET ORAL at 14:29

## 2019-12-04 RX ADMIN — LORAZEPAM 1 MG: 2 INJECTION INTRAMUSCULAR; INTRAVENOUS at 13:27

## 2019-12-04 RX ADMIN — SODIUM CHLORIDE 100 ML/HR: 900 INJECTION, SOLUTION INTRAVENOUS at 19:54

## 2019-12-04 RX ADMIN — ALBUTEROL SULFATE 10 MG: 2.5 SOLUTION RESPIRATORY (INHALATION) at 13:00

## 2019-12-04 RX ADMIN — CEFTRIAXONE 1 G: 1 INJECTION, POWDER, FOR SOLUTION INTRAMUSCULAR; INTRAVENOUS at 14:29

## 2019-12-04 RX ADMIN — IPRATROPIUM BROMIDE 0.5 MG: 0.5 SOLUTION RESPIRATORY (INHALATION) at 13:00

## 2019-12-04 RX ADMIN — HYDROCODONE BITARTRATE AND ACETAMINOPHEN 1 TABLET: 5; 325 TABLET ORAL at 13:57

## 2019-12-04 RX ADMIN — LORAZEPAM 1 MG: 2 INJECTION INTRAMUSCULAR; INTRAVENOUS at 15:35

## 2019-12-04 RX ADMIN — FUROSEMIDE 20 MG: 10 INJECTION, SOLUTION INTRAMUSCULAR; INTRAVENOUS at 14:30

## 2019-12-04 NOTE — ED PROVIDER NOTES
EMERGENCY DEPARTMENT HISTORY AND PHYSICAL EXAM          Date: 12/4/2019  Patient Name: Nickolas Vick    History of Presenting Illness     Chief Complaint   Patient presents with    Shortness of Breath       History Provided By: Patient    HPI: Nickolas Vick is a 80 y.o. female, pmhx hypertension, COPD, high cholesterol, hypothyroidism, who presents via EMS with family to the ED c/o shortness of breath. Patient is here with her daughter who notes that she fell Monday. She was seen at the [facility] [address] was diagnosed with rib contusions. Her daughter notes that over the past couple days she has had increasingly worse confusion after the fall and increasing shortness of breath. She further explains that in the outside facility she had x-rays of her chest and ribs taken but did not have a CT head or labs and is not given IV or IV fluids. Patient currently states she just feels anxious and needs treatment. EMS gave the patient a dose of albuterol in route which did not change her symptoms. Patient specifically denies any recent fevers, chills, nausea, vomiting, diarrhea, abd pain, urinary sxs, changes in BM, or headache. PCP: Narciso Harrington DO    Allergies: No known medication allergies  Social Hx: +tobacco, +EtOH, -Illicit Drugs    There are no other complaints, changes, or physical findings at this time. Current Facility-Administered Medications   Medication Dose Route Frequency Provider Last Rate Last Dose    LORazepam (ATIVAN) injection 1 mg  1 mg IntraVENous NOW OdetterKrystle MD         Current Outpatient Medications   Medication Sig Dispense Refill    baclofen (LIORESAL) 10 mg tablet Take 1 Tab by mouth two (2) times a day. 20 Tab 0    acetaminophen (TYLENOL) 325 mg tablet Take  by mouth every four (4) hours as needed for Pain.  senna (SENNA) 8.6 mg tablet Take 1 Tab by mouth two (2) times a day.       polyethylene glycol (MIRALAX) 17 gram packet Take 1 Packet by mouth daily. 30 Packet 0    bisacodyl (DULCOLAX, BISACODYL,) 10 mg suppository Insert 10 mg into rectum daily as needed for Other (constipation). 10 Suppository 0    magnesium citrate solution Drink one bottle. If you have not had large stool output, drink the second bottle. 2 Bottle 0    Nebulizer & Compressor machine 1 Each by Does Not Apply route every four (4) hours as needed. As directed 1 Each 0    albuterol (ACCUNEB) 1.25 mg/3 mL nebu Take 3 mL by inhalation every four (4) hours as needed (wheezing). 25 Each 0    PSYLLIUM SEED, WITH SUGAR, (METAMUCIL PO) Take  by mouth as needed.  atorvastatin (LIPITOR) 20 mg tablet Take  by mouth daily.  levothyroxine (SYNTHROID) 100 mcg tablet Take  by mouth Daily (before breakfast).  lisinopril (PRINIVIL, ZESTRIL) 40 mg tablet Take 40 mg by mouth daily.  albuterol (PROVENTIL HFA, VENTOLIN HFA) 90 mcg/actuation inhaler Take 1 Puff by inhalation.  hydrochlorothiazide (HYDRODIURIL) 25 mg tablet Take 25 mg by mouth daily.  calcium-cholecalciferol, d3, (CALCIUM 600 + D) 600-125 mg-unit Tab Take  by mouth.  omega-3 fatty acids-vitamin e (FISH OIL) 1,000 mg Cap Take 1 Cap by mouth.            Past History     Past Medical History:  Past Medical History:   Diagnosis Date    Arthritis     Breast cancer ([facility] [address] 4/4/2013    Carotid artery calcification     Constipation     COPD (chronic obstructive pulmonary disease) (HCC)     Dyslipidemia     Hypertension     Intraductal papilloma of breast 3/21/2013    Psoriasis     Thyroid adenoma     Thyroid disease        Past Surgical History:  Past Surgical History:   Procedure Laterality Date    HX OOPHORECTOMY      RIGHT    HX THYROIDECTOMY      PARTIAL       Family History:  Family History   Problem Relation Age of Onset    Stroke Mother         CEREBRAL HEMMORRHAGE    Cancer Father         LUNG    Cancer Daughter         LUNG/SPINE       Social History:  Social History Tobacco Use    Smoking status: Current Every Day Smoker     Packs/day: 0.75     Years: 45.00     Pack years: 33.75    Smokeless tobacco: Never Used   Substance Use Topics    Alcohol use: Yes     Alcohol/week: 5.8 standard drinks     Types: 7 Cans of beer per week    Drug use: Never       Allergies:  No Known Allergies      Review of Systems   Review of Systems   Constitutional: Negative for activity change, appetite change, chills, fever and unexpected weight change. HENT: Negative for congestion. Eyes: Negative for pain and visual disturbance. Respiratory: Positive for cough and shortness of breath. Cardiovascular: Positive for chest pain. Gastrointestinal: Negative for abdominal pain, diarrhea, nausea and vomiting. Genitourinary: Negative for dysuria. Musculoskeletal: Negative for back pain. Skin: Negative for rash. Neurological: Negative for headaches. Psychiatric/Behavioral: Positive for agitation and confusion. Physical Exam   Physical Exam  Vitals signs and nursing note reviewed. Constitutional:       Appearance: She is well-developed. She is not diaphoretic. Comments: This is a thin, anxious, elderly appearing female currently in moderate severe distress    HENT:      Head: Normocephalic and atraumatic. Eyes:      General:         Right eye: No discharge. Left eye: No discharge. Conjunctiva/sclera: Conjunctivae normal.      Pupils: Pupils are equal, round, and reactive to light. Neck:      Musculoskeletal: Normal range of motion and neck supple. Vascular: No JVD. Cardiovascular:      Rate and Rhythm: Regular rhythm. Tachycardia present. Heart sounds: Normal heart sounds. No murmur. Pulmonary:      Effort: Tachypnea, accessory muscle usage and respiratory distress present. Breath sounds: Decreased breath sounds, wheezing, rhonchi and rales present. Abdominal:      General: Bowel sounds are normal. There is no distension. Palpations: Abdomen is soft. Tenderness: There is no tenderness. Musculoskeletal: Normal range of motion. Right lower leg: Edema present. Left lower leg: Edema present. Skin:     General: Skin is warm and dry. Capillary Refill: Capillary refill takes less than 2 seconds. Findings: No rash. Neurological:      General: No focal deficit present. Mental Status: She is alert and oriented to person, place, and time. Cranial Nerves: No cranial nerve deficit. Motor: No abnormal muscle tone. Comments: Patient aware of place, date, situation       Diagnostic Study Results     Labs -     Recent Results (from the past 12 hour(s))   EKG, 12 LEAD, INITIAL    Collection Time: 12/04/19 12:51 PM   Result Value Ref Range    Ventricular Rate 137 BPM    Atrial Rate 137 BPM    P-R Interval 124 ms    QRS Duration 84 ms    Q-T Interval 298 ms    QTC Calculation (Bezet) 449 ms    Calculated P Axis 61 degrees    Calculated R Axis 8 degrees    Calculated T Axis 94 degrees    Diagnosis       Sinus tachycardia with premature supraventricular complexes  Nonspecific ST and T wave abnormality    Confirmed by Yomaira Toro ([id number]) on 12/4/2019 2:12:17 PM     POC G3 - PUL    Collection Time: 12/04/19  1:02 PM   Result Value Ref Range    pH (POC) 7.337 (L) 7.35 - 7.45      pCO2 (POC) 44.0 35.0 - 45.0 MMHG    pO2 (POC) 77 (L) 80 - 100 MMHG    HCO3 (POC) 23.6 22 - 26 MMOL/L    sO2 (POC) 94 92 - 97 %    Base deficit (POC) 2 mmol/L    Site LEFT RADIAL      Device: NASAL CANNULA      Flow rate (POC) 2 L/M    Allens test (POC) YES      Specimen type (POC) ARTERIAL      Total resp.  rate 25     CBC WITH AUTOMATED DIFF    Collection Time: 12/04/19  1:04 PM   Result Value Ref Range    WBC 15.7 (H) 3.6 - 11.0 K/uL    RBC 3.27 (L) 3.80 - 5.20 M/uL    HGB 10.0 (L) 11.5 - 16.0 g/dL    HCT 30.8 (L) 35.0 - 47.0 %    MCV 94.2 80.0 - 99.0 FL    MCH 30.6 26.0 - 34.0 PG    MCHC 32.5 30.0 - 36.5 g/dL    RDW 20.2 (H) 11.5 - 14.5 %    PLATELET 201 256 - 261 K/uL    MPV 9.8 8.9 - 12.9 FL    NRBC 0.3 (H) 0  WBC    ABSOLUTE NRBC 0.04 (H) 0.00 - 0.01 K/uL    NEUTROPHILS 87 (H) 32 - 75 %    BAND NEUTROPHILS 7 %    LYMPHOCYTES 4 (L) 12 - 49 %    MONOCYTES 2 (L) 5 - 13 %    EOSINOPHILS 0 0 - 7 %    BASOPHILS 0 0 - 1 %    IMMATURE GRANULOCYTES 0 0.0 - 0.5 %    ABS. NEUTROPHILS 14.8 (H) 1.8 - 8.0 K/UL    ABS. LYMPHOCYTES 0.6 (L) 0.8 - 3.5 K/UL    ABS. MONOCYTES 0.3 0.0 - 1.0 K/UL    ABS. EOSINOPHILS 0.0 0.0 - 0.4 K/UL    ABS. BASOPHILS 0.0 0.0 - 0.1 K/UL    ABS. IMM. GRANS. 0.0 0.00 - 0.04 K/UL    DF AUTOMATED      RBC COMMENTS ANISOCYTOSIS  2+       METABOLIC PANEL, COMPREHENSIVE    Collection Time: 12/04/19  1:04 PM   Result Value Ref Range    Sodium 130 (L) 136 - 145 mmol/L    Potassium 4.4 3.5 - 5.1 mmol/L    Chloride 96 (L) 97 - 108 mmol/L    CO2 25 21 - 32 mmol/L    Anion gap 9 5 - 15 mmol/L    Glucose 120 (H) 65 - 100 mg/dL    BUN 22 (H) 6 - 20 MG/DL    Creatinine 0.90 0.55 - 1.02 MG/DL    BUN/Creatinine ratio 24 (H) 12 - 20      GFR est AA >60 >60 ml/min/1.73m2    GFR est non-AA 59 (L) >60 ml/min/1.73m2    Calcium 8.1 (L) 8.5 - 10.1 MG/DL    Bilirubin, total 0.8 0.2 - 1.0 MG/DL    ALT (SGPT) 28 12 - 78 U/L    AST (SGOT) 24 15 - 37 U/L    Alk. phosphatase 94 45 - 117 U/L    Protein, total 6.5 6.4 - 8.2 g/dL    Albumin 3.0 (L) 3.5 - 5.0 g/dL    Globulin 3.5 2.0 - 4.0 g/dL    A-G Ratio 0.9 (L) 1.1 - 2.2     TROPONIN I    Collection Time: 12/04/19  1:04 PM   Result Value Ref Range    Troponin-I, Qt. <0.05 <0.05 ng/mL   NT-PRO BNP    Collection Time: 12/04/19  1:04 PM   Result Value Ref Range    NT pro-BNP 2,134 (H) <450 PG/ML   POC LACTIC ACID    Collection Time: 12/04/19  1:06 PM   Result Value Ref Range    Lactic Acid (POC) 2.02 (HH) 0.40 - 2.00 mmol/L       Radiologic Studies -   CT CHEST WO CONT   Final Result   IMPRESSION:    1. Acute right 2nd-10th rib fractures, as described above. 2. Small right pleural effusion. Right basilar linear and patchy airspace   disease. No pneumothorax. 3. Age-indeterminate T9 compression deformity, as described above. CT HEAD WO CONT   Final Result   IMPRESSION: No acute intracranial hemorrhage, mass or infarct. XR CHEST PORT   Final Result   IMPRESSION: Moderate pulmonary vascular congestive changes. Right basilar patchy   airspace disease. CT Results  (Last 48 hours)    None        CXR Results  (Last 48 hours)               12/04/19 1320  XR CHEST PORT Final result    Impression:  IMPRESSION: Moderate pulmonary vascular congestive changes. Right basilar patchy   airspace disease. Narrative:  INDICATION: Shortness of breath. Portable AP upright view of the chest.       Direct comparison made to prior chest x-ray dated November 25, 2019. Cardiomediastinal silhouette is stable. There is pulmonary vascular prominence   as well as bilateral interstitial edema. There is also right basilar patchy   airspace disease. No pleural fluid is visualized. There is no pneumothorax. The   osseous structures are diffusely demineralized. Medical Decision Making   I am the first provider for this patient. I reviewed the vital signs, available nursing notes, past medical history, past surgical history, family history and social history. Vital Signs-Reviewed the patient's vital signs.   Patient Vitals for the past 12 hrs:   Pulse Resp BP SpO2   12/04/19 1335 (!) 118 17  99 %   12/04/19 1300    97 %   12/04/19 1250 (!) 147 (!) 40 195/85 99 %       Pulse Oximetry Analysis - 97% on RA    Cardiac Monitor:   Rate: 156bpm  Rhythm: Sinus Tachycardia      Records Reviewed: Nursing Notes, Old Medical Records, Previous electrocardiograms, Ambulance Run Sheet, Previous Radiology Studies and Previous Laboratory Studies    Provider Notes (Medical Decision Making):   MDM: This is a distressed female who is anxious in sinus tachycardia with respiratory distress. She is not on home oxygen but appears diffusely wet with rhonchi, rales and wheezes. Will initiate broad evaluation for CHF, infection, COPD exacerbation. ED Course:   Initial assessment performed. The patients presenting problems have been discussed, and they are in agreement with the care plan formulated and outlined with them. I have encouraged them to ask questions as they arise throughout their visit. EKG interpretation: (Preliminary)  Rhythm: Sinus tachycardia with an occasional PVC at a rate of 137; left axis deviation; normal NV and QRS intervals; difficult to assess for ST-T due to artifact and baseline. PROGRESS NOTE:  1:05 PM  Pt continues with increased work of breathing. ABG with minimal hypoxia but without any CO2 retention to explain her confusion. Continue respiratory management and when patient is stable we will send her for CT brain. POC lactic acid notably elevated however given patient's fluid overload do not advise IV fluid infusion right now. We will continue monitor blood pressure and if that time she became hypotensive will address it then. 2 PM  Patient appears more comfortable and is breathing at a rate of 20 now with saturation of 99% on 2 L. Albuterol seems to have opened her lungs but she still has diffuse rhonchi. BNP minimally elevated. Patient stable to go over her CT we will check a CT of her chest as well as her head. 3pm  Notified that pt unable to lay still for CT and kept fidgeting. Will attempt repeat medication and repeat scan. 4 PM  Patient signed out to Dr. Burton Polanco MD with CT scans pending. He will disposition patient based on results of findings. 4:30 PM  CT head- neg,   CT Chest- Rib fractures 2-10, 2/3- segmental with underlying pleural effusion and pneumonia. Pt will need admission, for pulmonary rehab, IV Ab. Potential for chest tube.  T6 compression fx seen but family states they feel this is not new and had been there before. Pt resting comfortably with pain medications. Discussed results with family. Prior to the fall, pt active given her age, and the day of fall had been walking into the bank. Pt had received IV Ab prior to CT.     4:45 PM  Discussed briefly with hospitalist they will discuss with their director. I have multiple discussions with the family, potential options- including palliative/hospice, intubation or chest tube if needed. Pt has hx of COPD but independent prior to fall. At this time they will continue to discuss Hospice as treatment option with all of the family members but are not ready to make that decision currently. 7:15 PM  Discussed with Hospitalist, given multiple rib fxs and secondary to fall with injuries that may require further treatment evaluation, do not feel comfortable admitting pt to this facility as we do not have Thoracic Surgery amd due to trauma. 7:30 PM  Discussed with Dr. Noe Huggins ([facility]) after further discussion with family, Pt will be accepted as transfer to ED for evaluation and admission. Helenabrad Russ,       Critical Care Time:   CRITICAL CARE NOTE :  4 PM  IMPENDING DETERIORATION -Airway, Respiratory, Cardiovascular, CNS, Metabolic, Renal and Hepatic  ASSOCIATED RISK FACTORS - Hypotension, Shock, Hypoxia, Dysrhythmia, Metabolic changes and CNS Decompensation  MANAGEMENT- Bedside Assessment and Supervision of Care  INTERPRETATION -  Xrays, Blood Gases, ECG and Blood Pressure  INTERVENTIONS - hemodynamic mngmt and Metobolic interventions  CASE REVIEW - Nursing and Family  TREATMENT RESPONSE -Stable  PERFORMED BY - Self    NOTES   :  I have spent 60 minutes of critical care time involved in lab review, consultations with specialist, family decision- making, bedside attention and documentation. During this entire length of time I was immediately available to the patient. Jas Tavarez MD        Diagnosis     Clinical Impression:   1.  Respiratory distress    2. Closed fracture of multiple ribs with flail chest, initial encounter    3. Pneumonia of right lower lobe due to infectious organism ([address]    4. Pleural effusion    5. Compression fracture of T6 vertebra, initial encounter ([address]    6. Fall, initial encounter        PLAN:  1. Disposition based on findings of CT head and CT chest.      Please note, this dictation was completed with [employer], the computer voice recognition software. Quite often unanticipated grammatical, syntax, homophones, and other interpretive errors are inadvertently transcribed by the computer software. Please disregard these errors. Please excuse any errors that have escaped final proof reading.

## 2019-12-04 NOTE — ED TRIAGE NOTES
Pt arrives to ED with c/o SOB and anxiety. Pt daughter reports pt has had increased confusion over the last couple of days.  Pt had fall last week and has pain on the right side

## 2019-12-11 LAB
BACTERIA SPEC CULT: ABNORMAL
BACTERIA SPEC CULT: ABNORMAL
SERVICE CMNT-IMP: ABNORMAL

## 2021-08-26 NOTE — ED NOTES
[facility] contacted for [facility] & [facility] ALS transfer to [address] [place] [address] ED. ETA 1 hour.
[facility] transporting patient to [facility], paperwork completed and family in possession of personal items
Blood work collected, labeled, and sent to lab.
Case discussed with Nestor Medina MD, patient to be transferred to [facility] for further treatment
Case discussed with patient's daughters. Collaborative decision with family to place brambila catheter.
MD at bedside
MD at bedside
Pt back to ED room 12. Pt sleeping at this time. Pt placed on continuous cardiac monitor and pulse ox. Side rails up for safety, call light within reach.  Family at bedside
Pt to CT
Pt to CT.
Report received from [facility], [address] [place] [address]. They advised of the patient's chief complaint, current status, orders completed (to include IV access/medications/radiology testing), outstanding orders that still need to be completed, and the treatment plan. Questions asked and answered prior to assumption of care.
TRANSFER - OUT REPORT:    Verbal report given to ChiquiClemencia (name) on Destini Cure  being transferred to [facility] ED (unit) for urgent transfer       Report consisted of patients Situation, Background, Assessment and   Recommendations(SBAR). Information from the following report(s) SBAR, ED Summary, Procedure Summary, MAR and Recent Results was reviewed with the receiving nurse. Lines:   Peripheral IV 12/04/19 Left Wrist (Active)   Site Assessment Clean, dry, & intact 12/4/2019 12:52 PM   Phlebitis Assessment 0 12/4/2019 12:52 PM   Infiltration Assessment 0 12/4/2019 12:52 PM   Dressing Status Clean, dry, & intact 12/4/2019 12:52 PM   Dressing Type Transparent 12/4/2019 12:52 PM       Peripheral IV 12/04/19 Right Wrist (Active)   Site Assessment Clean, dry, & intact 12/4/2019  1:11 PM   Phlebitis Assessment 0 12/4/2019  1:11 PM   Infiltration Assessment 0 12/4/2019  1:11 PM   Dressing Status Clean, dry, & intact 12/4/2019  1:11 PM   Dressing Type Transparent 12/4/2019  1:11 PM        Opportunity for questions and clarification was provided. Patient being transported by [facility].
Xray at bedside
[FreeTextEntry1] : All labs reviewed with Oneal